# Patient Record
Sex: MALE | Race: WHITE | NOT HISPANIC OR LATINO | Employment: OTHER | URBAN - METROPOLITAN AREA
[De-identification: names, ages, dates, MRNs, and addresses within clinical notes are randomized per-mention and may not be internally consistent; named-entity substitution may affect disease eponyms.]

---

## 2017-03-01 ENCOUNTER — HOSPITAL ENCOUNTER (EMERGENCY)
Facility: HOSPITAL | Age: 67
Discharge: HOME | End: 2017-03-01
Payer: MEDICARE

## 2017-03-01 VITALS
HEIGHT: 69 IN | DIASTOLIC BLOOD PRESSURE: 71 MMHG | SYSTOLIC BLOOD PRESSURE: 161 MMHG | OXYGEN SATURATION: 95 % | TEMPERATURE: 98 F | BODY MASS INDEX: 29.62 KG/M2 | RESPIRATION RATE: 18 BRPM | HEART RATE: 87 BPM | WEIGHT: 200 LBS

## 2017-03-01 DIAGNOSIS — Z71.6 TOBACCO ABUSE COUNSELING: ICD-10-CM
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
## 2017-03-01 DIAGNOSIS — S43.002A SHOULDER SUBLUXATION, LEFT, INITIAL ENCOUNTER: ICD-10-CM

## 2017-03-01 DIAGNOSIS — M25.512 ACUTE PAIN OF LEFT SHOULDER: ICD-10-CM

## 2017-03-01 DIAGNOSIS — S42.352A CLOSED DISPLACED COMMINUTED FRACTURE OF SHAFT OF LEFT HUMERUS, INITIAL ENCOUNTER: Primary | ICD-10-CM
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
## 2017-03-01 PROCEDURE — 99283 EMERGENCY DEPT VISIT LOW MDM: CPT

## 2017-03-01 PROCEDURE — 25000003 PHARM REV CODE 250: Performed by: NURSE PRACTITIONER

## 2017-03-01 PROCEDURE — 29240 STRAPPING OF SHOULDER: CPT

## 2017-03-01 RX ORDER — HYDROCODONE BITARTRATE AND ACETAMINOPHEN 10; 325 MG/1; MG/1
1 TABLET ORAL
Status: COMPLETED | OUTPATIENT
Start: 2017-03-01 | End: 2017-03-01

## 2017-03-01 RX ORDER — HYDROCODONE BITARTRATE AND ACETAMINOPHEN 10; 325 MG/1; MG/1
1 TABLET ORAL EVERY 4 HOURS PRN
Qty: 18 TABLET | Refills: 0 | Status: SHIPPED | OUTPATIENT
Start: 2017-03-01 | End: 2017-06-06

## 2017-03-01 RX ORDER — MELOXICAM 15 MG/1
15 TABLET ORAL DAILY
Qty: 30 TABLET | Refills: 0 | Status: SHIPPED | OUTPATIENT
Start: 2017-03-01 | End: 2017-06-06

## 2017-03-01 RX ADMIN — HYDROCODONE BITARTRATE AND ACETAMINOPHEN 1 TABLET: 10; 325 TABLET ORAL at 08:03

## 2017-03-01 NOTE — ED AVS SNAPSHOT
[facility]  [address] [facility] [address]               Kwesi Garcia   3/1/2017  7:53 PM   ED    Description:  Male : 1950   Department:  [facility]            Your Care was Coordinated By:     Provider Role From To    Sang Gregg NP Nurse Practitioner 17 --      Reason for Visit     Shoulder Pain           Diagnoses this Visit        Comments    Closed displaced comminuted fracture of shaft of left humerus, initial encounter    -  Primary     Fall         Shoulder subluxation, left, initial encounter         Fall at home, initial encounter         Acute pain of left shoulder         Tobacco abuse counseling           ED Disposition     None           To Do List           Follow-up Information     Schedule an appointment as soon as possible for a visit with pcp or ER .        Schedule an appointment as soon as possible for a visit with [facility].    Specialty:  Orthopedics    Contact information:    [address] [facility] [address] [phone]    Additional information:    (off [facility]) 1st floor       These Medications        Disp Refills Start End    hydrocodone-acetaminophen 10-325mg (NORCO)  mg Tab 18 tablet 0 3/1/2017     Take 1 tablet by mouth every 4 (four) hours as needed for Pain. - Oral    meloxicam (MOBIC) 15 MG tablet 30 tablet 0 3/1/2017     Take 1 tablet (15 mg total) by mouth once daily. - Oral      [facility]     [facility] -  Assistance  Registered nurses in the [facility] provide clinical advisement, health education, appointment booking, and other advisory services.  Call for this free service at [phone].             Medications           START taking these NEW medications        Refills    hydrocodone-acetaminophen 10-325mg (NORCO)  mg Tab 0    Sig: Take 1 tablet by mouth every 4 (four) hours as  needed for Pain.    Class: Print    Route: Oral    meloxicam (MOBIC) 15 MG tablet 0    Sig: Take 1 tablet (15 mg total) by mouth once daily.    Class: Print    Route: Oral      These medications were administered today        Dose Freq    hydrocodone-acetaminophen 10-325mg per tablet 1 tablet 1 tablet ED 1 Time    Sig: Take 1 tablet by mouth ED 1 Time.    Class: Normal    Route: Oral    Cosign for Ordering: Accepted by Nicholas Strange MD on 3/1/2017  8:11 PM           Verify that the below list of medications is an accurate representation of the medications you are currently taking.  If none reported, the list may be blank. If incorrect, please contact your healthcare provider. Carry this list with you in case of emergency.           Current Medications     hydrocodone-acetaminophen 10-325mg (NORCO)  mg Tab Take 1 tablet by mouth every 4 (four) hours as needed for Pain.    meloxicam (MOBIC) 15 MG tablet Take 1 tablet (15 mg total) by mouth once daily.           Clinical Reference Information           Your Vitals Were     BP                   170/88 (BP Location: Right arm, Patient Position: Sitting)           Allergies as of 3/1/2017        Reactions    Ampicillin       Immunizations Administered on Date of Encounter - 3/1/2017     None      ED Micro, Lab, POCT     None      ED Imaging Orders     Start Ordered       Status Ordering Provider    [birth year]    1 time imaging,   Status:  Canceled      Canceled     03/01/17 1947 03/01/17 1946  X-Ray Shoulder Trauma Left  1 time imaging      Final result         Discharge Instructions         Understanding a Humerus Fracture      When you have a humerus fracture, it means that your upper arm bone is broken.This type of fracture most often occurs along the middle of the bone or at the end of the bone near the shoulder. Less often, it occurs at the end of the bone near the elbow. This mainly happens in kids or young adults.  The bone may be cracked,  or it may be broken into 2 or more pieces. The pieces of bone may be lined up or they may have moved out of place. Sometimes, the bone may break through the skin. Nearby nerves, tissues, and joints also may be damaged. Depending on the severity of the fracture, healing may take several months or longer.  What causes a humerus fracture?  A humerus fracture is most often the result of trauma. This may be from a fall, blow, accident, or sports injury.  Symptoms of a humerus fracture  Symptoms can include pain, swelling, and bruising. If the bone breaks through the skin,  bleeding can occur at the site. It may be hard to move and use the shoulder, arm, or elbow as you would normally.  Treating a humerus fracture  Treatment depends on where the bone is broken and how serious the break is. If needed, the bone is put back into place. This may be done with or without surgery. If surgery is needed, the surgeon may use devices such as pins, plates, or screws to hold the bone together. You will then wear a sling, splint, or cast to keep the bone in place and protect it from injury during healing. Other treatments may be also used to help reduce symptoms or regain function. These include:  · Cold packs. Putting an ice pack on the injured area may help reduce swelling and pain.  · Pain medicines. Taking prescription or over-the-counter pain medicines may help reduce pain and swelling.  · Exercises. Doing certain exercises at home or with a physical therapist can help improve strength, flexibility, and range of motion in the shoulder, arm, or elbow.  Possible complications of a humerus fracture  These can include:  · Poor healing of the bone  · Weakness, stiffness, or loss of range of motion in the shoulder, arm, or elbow  · Osteoarthritis in the shoulder or elbow  When to call your healthcare provider  Call your healthcare provider right away if you have any of these:  · Fever of 100.4°F (38°C) or higher, or as  directed  · Symptoms that dont get better with treatment, or get worse  · Numbness, tingling, coldness, or swelling in your arm, hand, or fingers  · Fingernails that turn blue or gray in color  · A sling, splint, or cast that is damaged or feels too tight or loose  · New symptoms   Date Last Reviewed: 3/10/2016  © 5207-0129 [employer]. [address] [place] [address], [place] [address]. All rights reserved. This information is not intended as a substitute for professional medical care. Always follow your healthcare professional's instructions.          [facility] Sign-Up     Activating your [facility] account is as easy as 1-2-3!     1) Visit [email or web address], select Sign Up Now, enter this activation code and your date of birth, then select Next.  [email or web address]  Expires: 4/15/2017  9:25 PM      2) Create a username and password to use when you visit [facility] in the future and select a security question in case you lose your password and select Next.    3) Enter your e-mail address and click Sign Up!    Additional Information  If you have questions, please e-mail [email or web address] or call [phone] to talk to our [facility] staff. Remember, [facility] is NOT to be used for urgent needs. For medical emergencies, dial 911.         Smoking Cessation     If you would like to quit smoking:   You may be eligible for free services if you are a [place] resident and started smoking cigarettes before September 1, 1988.  Call the Smoking Cessation Trust (SCT) toll free at (681) 391-1078 or (134) 323-3741.   Call 3-764-QUIT-NOW if you do not meet the above criteria.             [facility] complies with applicable Federal civil rights laws and does not discriminate on the basis of race, color, national origin, age, disability, or sex.        Language Assistance Services     ATTENTION: Language assistance services are available, free of charge. Please call [phone].      ATENCIÓN: Selene mccray  español, tiene a mendez disposición servicios gratuitos de asistencia lingüística. Llame al [phone].     CLAU Ý: N?u b?n nói Ti?ng Vi?t, có các d?ch v? h? tr? ngôn ng? mi?n phí dành cho b?n. G?i s? [phone].

## 2017-03-02 NOTE — DISCHARGE INSTRUCTIONS
Understanding a Humerus Fracture      When you have a humerus fracture, it means that your upper arm bone is broken.This type of fracture most often occurs along the middle of the bone or at the end of the bone near the shoulder. Less often, it occurs at the end of the bone near the elbow. This mainly happens in kids or young adults.  The bone may be cracked, or it may be broken into 2 or more pieces. The pieces of bone may be lined up or they may have moved out of place. Sometimes, the bone may break through the skin. Nearby nerves, tissues, and joints also may be damaged. Depending on the severity of the fracture, healing may take several months or longer.  What causes a humerus fracture?  A humerus fracture is most often the result of trauma. This may be from a fall, blow, accident, or sports injury.  Symptoms of a humerus fracture  Symptoms can include pain, swelling, and bruising. If the bone breaks through the skin,  bleeding can occur at the site. It may be hard to move and use the shoulder, arm, or elbow as you would normally.  Treating a humerus fracture  Treatment depends on where the bone is broken and how serious the break is. If needed, the bone is put back into place. This may be done with or without surgery. If surgery is needed, the surgeon may use devices such as pins, plates, or screws to hold the bone together. You will then wear a sling, splint, or cast to keep the bone in place and protect it from injury during healing. Other treatments may be also used to help reduce symptoms or regain function. These include:  · Cold packs. Putting an ice pack on the injured area may help reduce swelling and pain.  · Pain medicines. Taking prescription or over-the-counter pain medicines may help reduce pain and swelling.  · Exercises. Doing certain exercises at home or with a physical therapist can help improve strength, flexibility, and range of motion in the shoulder, arm, or elbow.  Possible complications  of a humerus fracture  These can include:  · Poor healing of the bone  · Weakness, stiffness, or loss of range of motion in the shoulder, arm, or elbow  · Osteoarthritis in the shoulder or elbow  When to call your healthcare provider  Call your healthcare provider right away if you have any of these:  · Fever of 100.4°F (38°C) or higher, or as directed  · Symptoms that dont get better with treatment, or get worse  · Numbness, tingling, coldness, or swelling in your arm, hand, or fingers  · Fingernails that turn blue or gray in color  · A sling, splint, or cast that is damaged or feels too tight or loose  · New symptoms   Date Last Reviewed: 3/10/2016  © 5788-8325 [employer]. [address] [place] [address], [place] [address]. All rights reserved. This information is not intended as a substitute for professional medical care. Always follow your healthcare professional's instructions.

## 2017-03-02 NOTE — ED PROVIDER NOTES
SCRIBE #1 NOTE: I, Sergio Mckenna, am scribing for, and in the presence of, MELINA Palma. I have scribed the entire note.      History      Chief Complaint   Patient presents with    Shoulder Pain     L shoulder pain after falling and landing on it yesterday       Review of patient's allergies indicates:   Allergen Reactions    Ampicillin         HPI   HPI    3/1/2017, 7:54 PM   History obtained from the patient      History of Present Illness: Kwesi Garcia is a 66 y.o. male patient who presents to the Emergency Department for Evaluation of left shoulder pain onset after a ground level fall which occurred last night. Pt states he was drinking last night and he fell onto his left shoulder on the floor at his apartment. Sx are constant and moderate in severity. Sx are described as generalized pain. There are no mitigating or exacerbating factors noted. Associated sx include decreased ROM of shoulder.  Pt denies any head trauma, hip pain, knee pain, back pain, neck pain/stiffness, LOC, dizziness, abd pain, CP, SOB, weakness or numbness to extremities, and all other sx at this time. No further complaints or concerns at this time.         Arrival mode: Personal vehicle      PCP: Primary Doctor No       Past Medical History:  History reviewed. No pertinent past medical history.    Past Surgical History:  Past Surgical History:   Procedure Laterality Date    ADENOIDECTOMY      APPENDECTOMY      TONSILLECTOMY           Family History:  History reviewed. No pertinent family history.    Social History:  Social History     Social History Main Topics    Smoking status: Current Every Day Smoker     Packs/day: 0.50     Types: Cigarettes    Smokeless tobacco: Not on file    Alcohol use Yes      Comment: 3 times a week    Drug use: No    Sexual activity: Not on file       ROS   Review of Systems   Constitutional: Negative for chills and fever.   HENT: Negative for sore throat.    Eyes: Negative for visual  disturbance.   Respiratory: Negative for cough and shortness of breath.    Cardiovascular: Negative for chest pain.   Gastrointestinal: Negative for abdominal pain, nausea and vomiting.   Genitourinary: Negative for dysuria.   Musculoskeletal: Negative for back pain, neck pain and neck stiffness.        - hip pain  - knee pain  + left shoulder pain  + decreased ROM L shoulder     Skin: Negative for rash.   Neurological: Negative for dizziness, syncope, speech difficulty, weakness, numbness and headaches.   Hematological: Does not bruise/bleed easily.   All other systems reviewed and are negative.      Physical Exam    Initial Vitals   BP Pulse Resp Temp SpO2   03/01/17 1917 03/01/17 1917 03/01/17 1917 03/01/17 1917 03/01/17 1917   170/88 90 18 97.8 °F (36.6 °C) 95 %      Physical Exam  Nursing Notes and Vital Signs Reviewed.  Constitutional: Patient is in no acute distress. Awake and alert. Well-developed and well-nourished.  Head: Atraumatic. Normocephalic.  Eyes: PERRL. EOM intact. Conjunctivae are not pale. No scleral icterus.  ENT: Mucous membranes are moist. Oropharynx is clear and symmetric.    Neck: Supple. Full ROM.  No vertebral point tenderness.  Cardiovascular: Regular rate. Regular rhythm. No murmurs, rubs, or gallops. Distal pulses are 2+ and symmetric.  Pulmonary/Chest: No respiratory distress. Clear to auscultation bilaterally. No wheezing, rales, or rhonchi.  Abdominal: Soft and non-distended.  There is no tenderness.  No rebound, guarding, or rigidity.     Musculoskeletal: Moves all extremities.  No edema.  No vertebral point tenderness.  LUE: has no evident deformity. negative for swelling. Positive for tenderness to the shoulder. ROM is decreased. Cap refill distally is <2 seconds. Radial pulses are equal and 2+ bilaterally. No distal sensory deficit.  Skin: Warm and dry.  Neurological:  Alert, awake, and appropriate.  Normal speech.  No acute focal neurological deficits are  "appreciated.  Psychiatric: Normal affect. Good eye contact. Appropriate in content.    ED Course    Orthopedic Injury  Date/Time: 3/1/2017 9:22 PM  Authorized by: GEREMIAS LORD   Performed by: GEREMIAS LORD  Consent Done: Not Needed  Injury location: upper arm  Location details: left upper arm  Injury type: fracture (Comminuted, displaced and overlapped proximal left humerus fracture.  Mild subluxation of the humeral head in relationship to the glenoid)  Pre-procedure distal perfusion: normal  Pre-procedure neurological function: normal  Pre-procedure neurovascular assessment: neurovascularly intact  Pre-procedure range of motion: reduced  Manipulation performed: no  Immobilization: sling (and swathe)  Complications: No  Post-procedure neurovascular assessment: post-procedure neurovascularly intact  Post-procedure distal perfusion: normal  Post-procedure neurological function: normal  Post-procedure range of motion: unchanged  Patient tolerance: Patient tolerated the procedure well with no immediate complications        ED Vital Signs:  Vitals:    03/01/17 1917 03/01/17 2138   BP: (!) 170/88 (!) 161/71   Pulse: 90 87   Resp: 18 18   Temp: 97.8 °F (36.6 °C)    TempSrc: Oral    SpO2: 95% 95%   Weight: 90.7 kg (200 lb)    Height: 5' 9" (1.753 m)          Imaging Results:  Imaging Results         X-Ray Shoulder Trauma Left (Final result) Result time:  03/01/17 20:21:01    Final result by Sammy Berry MD (03/01/17 20:21:01)    Impression:           1.  Comminuted, displaced and overlapped proximal left humerus fracture.  Mild subluxation of the humeral head in relationship to the glenoid, possibly related to a shoulder joint effusion.  2.  Negative for acute process otherwise.  3. Incidental findings as noted above.        Electronically signed by: SAMYM BERRY MD  Date:     03/01/17  Time:    20:21     Narrative:    Left shoulder x-ray, 2 views :    Clinical Indication: Injury to the left shoulder from an " unspecified fall.  Pain in left shoulder.     Findings: No comparison studies are available. 3 views of the left shoulder were submitted for interpretation.  There is a comminuted, displaced and compressed proximal left humerus fracture.  There appears to be a shoulder effusion with possible subluxation of the humeral head in relationship to the glenoid, inferiorly.     Alignment is satisfactory. No other fractures, dislocations, or erosive arthritic change.  Negative for radiopaque foreign bodies or air in the soft tissues. Old left clavicle fracture noted. There are calcifications of the aortic knob and tortuosity of the descending thoracic aorta. There is old granulomatous disease.  Visualized portions of the chest are otherwise normal.                 The Emergency Provider reviewed the vital signs and test results, which are outlined above.    ED Discussion     9:15 PM:  Discussed the pt's case with Dr. Montague (orthopedics) who recommends shoulder immobilizer or sling and swath whichever is available. To instruct pt to keep moving his hand and fingers to prevent swelling. Send pt home with pain medication, and to have pt f/u in clinic.      9:25 PM: Reassessed pt at this time. Awake and alert. NAD. Pt states his pain has improved at this time. Discussed with pt all pertinent ED information and results. Discussed pt dx and plan of tx. Gave pt all f/u in ortho clinic and return to the ED instructions. Reviewed consult with Dr. Montague with pt. Have pt move hands and fingers every hour.  All questions and concerns were addressed at this time. Pt expresses understanding of information and instructions, and is comfortable with plan to discharge. Pt is stable for discharge.    I discussed with patient and/or family/caretaker that evaluation in the ED does not suggest any emergent or life threatening medical conditions requiring immediate intervention beyond what was provided in the ED, and I believe patient is safe  for discharge.  Regardless, an unremarkable evaluation in the ED does not preclude the development or presence of a serious of life threatening condition. As such, patient was instructed to return immediately for any worsening or change in current symptoms.      Pre-hypertension/Hypertension: The pt has been informed that they may have pre-hypertension or hypertension based on a blood pressure reading in the ED. I recommend that the pt call the PCP listed on their discharge instructions or a physician of their choice this week to arrange f/u for further evaluation of possible pre-hypertension or hypertension.     ED Medication(s):  Medications   hydrocodone-acetaminophen 10-325mg per tablet 1 tablet (1 tablet Oral Given 3/1/17 2018)       Discharge Medication List as of 3/1/2017  9:25 PM      START taking these medications    Details   hydrocodone-acetaminophen 10-325mg (NORCO)  mg Tab Take 1 tablet by mouth every 4 (four) hours as needed for Pain., Starting 3/1/2017, Until Discontinued, Print      meloxicam (MOBIC) 15 MG tablet Take 1 tablet (15 mg total) by mouth once daily., Starting 3/1/2017, Until Discontinued, Print             Follow-up Information     Schedule an appointment as soon as possible for a visit with pcp or ER .        Schedule an appointment as soon as possible for a visit with [facility].    Specialty:  Orthopedics    Contact information:    [address] [place] [address] [phone]    Additional information:    (off [facility]) 1st floor             Medical Decision Making    Medical Decision Making:   Clinical Tests:   Radiological Study: Ordered and Reviewed     Additional MDM:   Smoking Cessation: The patient is a smoker. The patient was counseled on smoking cessation for: 5 minutes. The patient was counseled on tobacco related  health complications.        Scribe Attestation:   Scribe #1: I performed the above scribed service and the  documentation accurately describes the services I performed. I attest to the accuracy of the note.    APC:   APC Attestation Statement for Scribe #1: I, Sang Gregg NP, personally performed the services described in this documentation, as scribed by Sergio Mckenna in my presence, and it is both accurate and complete.          Clinical Impression       ICD-10-CM ICD-9-CM   1. Closed displaced comminuted fracture of shaft of left humerus, initial encounter S42.352A 812.21   2. Fall W19.XXXA E888.9   3. Shoulder subluxation, left, initial encounter S43.002A 831.00   4. Fall at home, initial encounter W19.XXXA E888.9    Y92.099 E849.0   5. Acute pain of left shoulder M25.512 719.41   6. Tobacco abuse counseling Z71.6 V65.42     305.1       Disposition:   Disposition: Discharged  Condition: Stable         Sang Gregg NP  03/02/17 0246

## 2017-03-06 ENCOUNTER — OFFICE VISIT (OUTPATIENT)
Dept: ORTHOPEDICS | Facility: CLINIC | Age: 67
End: 2017-03-06
Payer: MEDICARE

## 2017-03-06 VITALS
HEIGHT: 69 IN | HEART RATE: 82 BPM | WEIGHT: 200 LBS | BODY MASS INDEX: 29.62 KG/M2 | SYSTOLIC BLOOD PRESSURE: 125 MMHG | DIASTOLIC BLOOD PRESSURE: 78 MMHG

## 2017-03-06 DIAGNOSIS — S42.242A CLOSED 4-PART FRACTURE OF SURGICAL NECK OF LEFT HUMERUS, INITIAL ENCOUNTER: Primary | ICD-10-CM
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
## 2017-03-06 PROCEDURE — 99204 OFFICE O/P NEW MOD 45 MIN: CPT | Mod: S$PBB,,, | Performed by: PHYSICIAN ASSISTANT

## 2017-03-06 PROCEDURE — 99213 OFFICE O/P EST LOW 20 MIN: CPT | Mod: PBBFAC | Performed by: PHYSICIAN ASSISTANT

## 2017-03-06 PROCEDURE — 99999 PR PBB SHADOW E&M-EST. PATIENT-LVL III: CPT | Mod: PBBFAC,,, | Performed by: PHYSICIAN ASSISTANT
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
## 2017-03-08 ENCOUNTER — TELEPHONE (OUTPATIENT)
Dept: RADIOLOGY | Facility: HOSPITAL | Age: 67
End: 2017-03-08

## 2017-03-14 ENCOUNTER — HOSPITAL ENCOUNTER (OUTPATIENT)
Dept: RADIOLOGY | Facility: HOSPITAL | Age: 67
Discharge: HOME | End: 2017-03-14
Attending: ORTHOPAEDIC SURGERY
Payer: MEDICARE

## 2017-03-14 DIAGNOSIS — S42.242A CLOSED 4-PART FRACTURE OF SURGICAL NECK OF LEFT HUMERUS, INITIAL ENCOUNTER: ICD-10-CM
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
## 2017-03-14 PROCEDURE — 73221 MRI JOINT UPR EXTREM W/O DYE: CPT | Mod: TC,LT

## 2017-03-16 ENCOUNTER — TELEPHONE (OUTPATIENT)
Dept: ORTHOPEDICS | Facility: CLINIC | Age: 67
End: 2017-03-16

## 2017-03-16 NOTE — TELEPHONE ENCOUNTER
Contacted pt. Regarding appt scheduled to be seen 4/09/12 and rescheduled for sooner date of 3/21/17 at 1:15 PM at [facility] location.  Pt provided updated information regarding appt date,time, and location.  Pt. Verbalized an understanding to information given.

## 2017-03-21 ENCOUNTER — TELEPHONE (OUTPATIENT)
Dept: ORTHOPEDICS | Facility: CLINIC | Age: 67
End: 2017-03-21

## 2017-03-21 NOTE — TELEPHONE ENCOUNTER
Called pt to inform of Dr. Rollins running behind due to emergency surgery. Pt verified understanding.

## 2017-04-03 ENCOUNTER — TELEPHONE (OUTPATIENT)
Dept: ORTHOPEDICS | Facility: CLINIC | Age: 67
End: 2017-04-03

## 2017-04-20 ENCOUNTER — OFFICE VISIT (OUTPATIENT)
Dept: ORTHOPEDICS | Facility: CLINIC | Age: 67
End: 2017-04-20
Payer: MEDICARE

## 2017-04-20 VITALS — BODY MASS INDEX: 24.5 KG/M2 | HEIGHT: 69 IN | WEIGHT: 165.38 LBS

## 2017-04-20 DIAGNOSIS — M25.512 CHRONIC LEFT SHOULDER PAIN: Primary | ICD-10-CM

## 2017-04-20 DIAGNOSIS — G89.29 CHRONIC LEFT SHOULDER PAIN: Primary | ICD-10-CM

## 2017-04-20 DIAGNOSIS — M25.512 ACUTE PAIN OF LEFT SHOULDER: Primary | ICD-10-CM

## 2017-04-20 PROCEDURE — 99212 OFFICE O/P EST SF 10 MIN: CPT | Mod: PBBFAC,PO | Performed by: ORTHOPAEDIC SURGERY

## 2017-04-20 PROCEDURE — 99213 OFFICE O/P EST LOW 20 MIN: CPT | Mod: S$PBB,,, | Performed by: ORTHOPAEDIC SURGERY

## 2017-04-20 PROCEDURE — 99999 PR PBB SHADOW E&M-EST. PATIENT-LVL II: CPT | Mod: PBBFAC,,, | Performed by: ORTHOPAEDIC SURGERY

## 2017-04-20 NOTE — PROGRESS NOTES
"CC:This is a 66-year-old male that complains of left shoulder pain.    HPI:The patient states that he injured his left shoulder Approximately 1 month ago.    PMH:  No past medical history on file.    PSH:    Past Surgical History:   Procedure Laterality Date    ADENOIDECTOMY      APPENDECTOMY      TONSILLECTOMY         Family Hx:  No family history on file.    Allergy:    Review of patient's allergies indicates:   Allergen Reactions    Ampicillin        Medication:    Current Outpatient Prescriptions:     hydrocodone-acetaminophen 10-325mg (NORCO)  mg Tab, Take 1 tablet by mouth every 4 (four) hours as needed for Pain., Disp: 18 tablet, Rfl: 0    meloxicam (MOBIC) 15 MG tablet, Take 1 tablet (15 mg total) by mouth once daily., Disp: 30 tablet, Rfl: 0    Social History:    Social History     Social History    Marital status:      Spouse name: N/A    Number of children: N/A    Years of education: N/A     Occupational History    Not on file.     Social History Main Topics    Smoking status: Current Every Day Smoker     Packs/day: 0.50     Types: Cigarettes    Smokeless tobacco: Never Used    Alcohol use 1.8 oz/week     3 Standard drinks or equivalent per week      Comment: 3 times a week    Drug use: No    Sexual activity: Not on file     Other Topics Concern    Not on file     Social History Narrative       Vitals:   Ht 5' 9" (1.753 m)  Wt 75 kg (165 lb 5.5 oz)  BMI 24.42 kg/m2     ROS:  GENERAL: No fever, chills, fatigability or weight loss.  SKIN: No rashes, itching or changes in color or texture of skin.  HEAD: No headaches or recent head trauma.  EYES: Visual acuity fine. No photophobia, ocular pain or diplopia.  EARS: Denies ear pain, discharge or vertigo.  NOSE: No loss of smell, no epistaxis or postnasal drip.  MOUTH & THROAT: No hoarseness or change in voice. No excessive gum bleeding.  NODES: Denies swollen glands.  CHEST: Denies PARSONS, cyanosis, wheezing, cough and sputum " production.  CARDIOVASCULAR: Denies chest pain, PND, orthopnea or reduced exercise tolerance.  ABDOMEN: Appetite fine. No weight loss. Denies diarrhea, abdominal pain, hematemesis or blood in stool.  URINARY: No flank pain, dysuria or hematuria.  PERIPHERAL VASCULAR: No claudication or cyanosis.  NEUROLOGIC: No history of seizures, paralysis, alteration of gait or coordination.  MUSCULOSKELETAL: See HPI    PE:  APPEARANCE: Well nourished, well developed, in no acute distress.   HEAD: Normocephalic, atraumatic.  EYES: PERRL. EOMI.   EARS: TM's intact. Light reflex normal. No retraction or perforation.   NOSE: Mucosa pink. Airway clear.  MOUTH & THROAT: No tonsillar enlargement. No pharyngeal erythema or exudate. No stridor.  NECK: Supple.   NODES: No cervical, axillary or inguinal lymph node enlargement.  CHEST: Lungs clear to auscultation.  CARDIOVASCULAR: Normal S1, S2. No rubs, murmurs or gallops.  ABDOMEN: Bowel sounds normal. Not distended. Soft. No tenderness or masses.  NEUROLOGIC: Cranial Nerves: II-XII grossly intact, also see MUSCULOSKELETAL  MUSCULOSKELETAL:            Left Shoulder-  Dressing intact, 2 plus radial  artery and ulnar artery pulses, light touch intact Left upper extremity.  All digits are warm. No erythema, no warmth, no drainage, minimalswelling, significant tenderness.           Assessment:  i have explained to the patient the risk and benefit of performing an open reduction and internal fixation, conservative nonsurgical treatment, and a reverse shoulder replacement.         Diagnosis:              1.left shoulder four part humeral head fracture                   Diagnostic Studies  MRI-No  X-Ray-No  EMG/NCV-No  Arthrogram-No  Bone Scan-No  CT Scan-Yes  Doppler-No  ESR-No  CRP-No  CBC with Diff-No   Rheumatoid/Arthritis Panel-No      Plan:                                                 1. PT-no                                                 2.OT-yes                                           3.NSAID-yes                                        4. Narcotics-yes                                     5. Wound care-No                                 6. Rest-yes                                           7. Surgery-no                                         8. EDWIN Hose-no                                    9. Anticoagulation therapy-no               10. Elevation-no                                     11. Crutches-no                                    12. Walker-no             13. Cane no                        14. Referral-no                                     15.Injection-no                            16. Splint   /    Cast   /   Cast Shoe-No              17. RICE-none            18. Follow up- 3 weeks

## 2017-04-20 NOTE — PATIENT INSTRUCTIONS
Reverse Total Shoulder Replacement  Reverse total shoulder replacement is a type of surgery. Its done to repair an injury to the rotator cuff.  Understanding the shoulder joint  The shoulder joint is where the ball-shaped part of the upper arm bone (humerus) meets the cup-shaped socket of the shoulder blade (scapula). A group of muscles and tendons hold the joint together. These muscles and tendons are called the rotator cuff. The muscles let you move your arm and shoulder.  Why reverse total shoulder replacement is done  The surgery may be needed if you have a complete tear of your rotator cuff. The tear may cause long-term problems with your shoulder joint. This is called cuff tear arthropathy. You may need reverse total shoulder replacement surgery if you have any of these:  · A complete tear in your rotator cuff  · Joint problems from the cuff tear (cuff tear arthropathy)  · Previous total shoulder replacement surgery that didnt relieve symptoms  · Severe pain and trouble moving your shoulder  · No success relieving your symptoms with treatments such as rest, medicines, cortisone injections, or physical therapy  How reverse total shoulder replacement is done  The surgery is the opposite (reverse) of standard total shoulder replacement surgery:  · In the standard surgery, the ball of the humerus is replaced with an artificial ball. The socket of the scapula is replaced with an artificial socket. The new joint still uses the rotator cuff muscles to move the arm and shoulder.  · With the reverse surgery, the ball of the humerus is replaced with an artificial socket. The socket of the scapula is replaced with an artificial ball. Since the rotator cuff muscles are damaged, another muscle (deltoid) moves the arm and shoulder.  Risks of reverse total shoulder replacement  Every surgery has risks. Risks for this surgery include:  · Infection  · Blood loss  · Damage to nerves that may make it hard to move your  arm  · Damage to the humerus or scapula  · Artificial joint moving out of position (dislocation)  · Problems with general anesthesia  Some risks may be higher if you have had shoulder surgery in the past. Your risks may vary depending on your shoulder problem and your overall health. Talk with your doctor about which risks apply most to you.  Date Last Reviewed: 6/16/2015 © 2000-2016 [employer]. [address] [place] [address]. All rights reserved. This information is not intended as a substitute for professional medical care. Always follow your healthcare professional's instructions.

## 2017-04-24 ENCOUNTER — TELEPHONE (OUTPATIENT)
Dept: RADIOLOGY | Facility: HOSPITAL | Age: 67
End: 2017-04-24

## 2017-04-25 ENCOUNTER — HOSPITAL ENCOUNTER (OUTPATIENT)
Dept: RADIOLOGY | Facility: HOSPITAL | Age: 67
Discharge: HOME | End: 2017-04-25
Attending: ORTHOPAEDIC SURGERY
Payer: MEDICARE

## 2017-04-25 DIAGNOSIS — G89.29 CHRONIC LEFT SHOULDER PAIN: ICD-10-CM

## 2017-04-25 DIAGNOSIS — M25.512 CHRONIC LEFT SHOULDER PAIN: ICD-10-CM

## 2017-04-25 PROCEDURE — 73200 CT UPPER EXTREMITY W/O DYE: CPT | Mod: TC,PO,LT

## 2017-04-25 PROCEDURE — 73200 CT UPPER EXTREMITY W/O DYE: CPT | Mod: 26,LT,, | Performed by: RADIOLOGY

## 2017-05-01 ENCOUNTER — TELEPHONE (OUTPATIENT)
Dept: ORTHOPEDICS | Facility: CLINIC | Age: 67
End: 2017-05-01

## 2017-05-01 NOTE — TELEPHONE ENCOUNTER
----- Message from Emily Gandara sent at 5/1/2017 12:32 PM CDT -----  Contact: self [phone]  State that he need a statement on a letterhead stating that pt is unable to return to work. Please call back at [phone]//thank you acc

## 2017-05-01 NOTE — TELEPHONE ENCOUNTER
Returned pt phone call.pt states he needs a return to work letter. Pt was also informed of a an upcoming appointment for 5/4 at 1:15p. Pt confirmed.

## 2017-05-01 NOTE — TELEPHONE ENCOUNTER
----- Message from Karnia Piper sent at 5/1/2017  1:09 PM CDT -----  returned call..[phone] (home)

## 2017-05-04 ENCOUNTER — OFFICE VISIT (OUTPATIENT)
Dept: ORTHOPEDICS | Facility: CLINIC | Age: 67
End: 2017-05-04
Payer: MEDICARE

## 2017-05-04 VITALS
HEIGHT: 69 IN | WEIGHT: 155.63 LBS | SYSTOLIC BLOOD PRESSURE: 130 MMHG | BODY MASS INDEX: 23.05 KG/M2 | DIASTOLIC BLOOD PRESSURE: 88 MMHG | HEART RATE: 83 BPM

## 2017-05-04 DIAGNOSIS — G89.29 CHRONIC LEFT SHOULDER PAIN: Primary | ICD-10-CM

## 2017-05-04 DIAGNOSIS — M25.512 CHRONIC LEFT SHOULDER PAIN: Primary | ICD-10-CM

## 2017-05-04 DIAGNOSIS — Z01.818 PRE-OP TESTING: ICD-10-CM

## 2017-05-04 DIAGNOSIS — S42.352G: Primary | ICD-10-CM

## 2017-05-04 PROCEDURE — 99213 OFFICE O/P EST LOW 20 MIN: CPT | Mod: S$PBB,,, | Performed by: PHYSICIAN ASSISTANT

## 2017-05-04 PROCEDURE — 99213 OFFICE O/P EST LOW 20 MIN: CPT | Mod: PBBFAC,PO | Performed by: PHYSICIAN ASSISTANT

## 2017-05-04 PROCEDURE — 99999 PR PBB SHADOW E&M-EST. PATIENT-LVL III: CPT | Mod: PBBFAC,,, | Performed by: PHYSICIAN ASSISTANT

## 2017-05-04 NOTE — PROGRESS NOTES
"CC: 66-year-old male left shoulder pain  Date of injury; March 1, 2017,     HPI: The patient is at home, he tripped over his daughter and let us left shoulder.  He reported emergency room for evaluation where he was diagnosed with a fractured humeral head.  He's tried to let this heal conservatively however this point his range of motion is decreasing he's had increased pain and his affect his ADLs.    PMH:  History reviewed. No pertinent past medical history.    PSH:    Past Surgical History:   Procedure Laterality Date    ADENOIDECTOMY      APPENDECTOMY      TONSILLECTOMY         Family Hx:  History reviewed. No pertinent family history.    Allergy:    Review of patient's allergies indicates:   Allergen Reactions    Ampicillin        Medication:    Current Outpatient Prescriptions:     hydrocodone-acetaminophen 10-325mg (NORCO)  mg Tab, Take 1 tablet by mouth every 4 (four) hours as needed for Pain., Disp: 18 tablet, Rfl: 0    meloxicam (MOBIC) 15 MG tablet, Take 1 tablet (15 mg total) by mouth once daily., Disp: 30 tablet, Rfl: 0    Social History:    Social History     Social History    Marital status:      Spouse name: N/A    Number of children: N/A    Years of education: N/A     Occupational History    Not on file.     Social History Main Topics    Smoking status: Current Every Day Smoker     Packs/day: 0.50     Types: Cigarettes    Smokeless tobacco: Never Used    Alcohol use 1.8 oz/week     3 Standard drinks or equivalent per week      Comment: 3 times a week    Drug use: No    Sexual activity: Not on file     Other Topics Concern    Not on file     Social History Narrative       Vitals:   /88  Pulse 83  Ht 5' 9" (1.753 m)  Wt 70.6 kg (155 lb 10.3 oz)  BMI 22.98 kg/m2     ROS:  GENERAL: No fever, chills, fatigability or weight loss.  SKIN: No rashes, itching or changes in color or texture of skin.  HEAD: No headaches or recent head trauma.  EYES: Visual acuity fine. No " photophobia, ocular pain or diplopia.  EARS: Denies ear pain, discharge or vertigo.  NOSE: No loss of smell, no epistaxis or postnasal drip.  MOUTH & THROAT: No hoarseness or change in voice. No excessive gum bleeding.  NODES: Denies swollen glands.  CHEST: Denies PARSONS, cyanosis, wheezing, cough and sputum production.  CARDIOVASCULAR: Denies chest pain, PND, orthopnea or reduced exercise tolerance.  ABDOMEN: Appetite fine. No weight loss. Denies diarrhea, abdominal pain, hematemesis or blood in stool.  URINARY: No flank pain, dysuria or hematuria.  PERIPHERAL VASCULAR: No claudication or cyanosis.  NEUROLOGIC: No history of seizures, paralysis, alteration of gait or coordination.  MUSCULOSKELETAL: See HPI    PE:  APPEARANCE: Well nourished, well developed, in no acute distress.   HEAD: Normocephalic, atraumatic.  NEUROLOGIC: Cranial Nerves: II-XII grossly intact, also see MUSCULOSKELETAL  MUSCULOSKELETAL: left Shoulder Exam     Muscle Appearance:Abnormal   Grooming:Normal  Spine Alignment-Normal  Muscle Atrophy-Yes  Deformities-Yes  Tenderness-Yes  Paresthesias-No  Range of Motion-decreased         Abd Pure-decreased         Abd Combined-decreased         Ext-decreased         Flex-decreased         Internal Rot-decreased         External Rot-decreased  Muscle Strength-decreased  Sensation-normal  Reflexes-Normal  Crepitus-Yes  Impingement-Yes  Apprehension-Yes  Fatigue-Yes  Scapular Winging-No  Muscle Tightness-Yes  Instability-Yes  Tests on Exam-no evidence of instability  Neurovascular Status-Normal  Skin-Normal  Positive Drop Arm testYes    Assessment:           Diagnosis:              1.  Left shoulder four-part humeral head fracture               2.  Internal left shoulder derangement    Diagnostic Studies  MRI-Yes, There are avulsion fractures associated with the attachment of the supraspinatus, infraspinatus, teres minor, and subscapularis tendons on the humeral head.    2.  There is a complete transverse  fracture through the left humeral neck.  The main portion of the left humeral head is displaced laterally by up to 14 mm.   3.  There is a large amount of fluid in the left glenohumeral joint that communicates with the subacromial/subdeltoid bursa.    4.  There are minimal osteoarthritic changes in the left acromioclavicular joint with no significant downward proliferation.  There is a type II acromion process with mild lateral downsloping.  X-Ray-Yes    CT Scan-Yes, Findings: There is a comminuted fracture involving the head/neck of the humerus that involvement of the articular surface of the humeral head medially and posteriorly.  The humeral shaft is displaced medially in relation to the humeral head with significant impaction noted at the fracture site.  There is comminution in the region of the greater tuberosity as well.  The articular surface of the humeral head is inferiorly subluxed in relation to the glenoid.  The a.c. joint is unremarkable in appearance.        Plan:                                                 1. PT-no                                                 2.OT-no                                          3.NSAID-no                                        4. Narcotics-no                                     5. Wound care-N/A                                 6. Rest-no                                           7. Surgery-yes  left reverse shoulder after failing conservative treatment                                     8. EDWIN Hose-no                                    9. Anticoagulation therapy-no               10. Elevation-no                                     11. Crutches-no                                    12. Walker-no             13. Cane no                        14. Referral-no                                     15.Injection-no                            16. Splint   /    Cast   /   Cast Shoe-No              17. RICE            18. Follow up-  all the patient's questions and concern  were answered great detail by Dr Rollins and myself.  The patient elects to proceed with a left reverse shoulder arthroplasty.   Risks and benefits were discussed in great detail including complications nonunion nonhealing and also he's been advised that his range of motion would not return to the 100% marked up prior to the injury.

## 2017-05-04 NOTE — PATIENT INSTRUCTIONS
Reverse Total Shoulder Replacement  Reverse total shoulder replacement is a type of surgery. Its done to repair an injury to the rotator cuff.  Understanding the shoulder joint  The shoulder joint is where the ball-shaped part of the upper arm bone (humerus) meets the cup-shaped socket of the shoulder blade (scapula). A group of muscles and tendons hold the joint together. These muscles and tendons are called the rotator cuff. The muscles let you move your arm and shoulder.  Why reverse total shoulder replacement is done  The surgery may be needed if you have a complete tear of your rotator cuff. The tear may cause long-term problems with your shoulder joint. This is called cuff tear arthropathy. You may need reverse total shoulder replacement surgery if you have any of these:  · A complete tear in your rotator cuff  · Joint problems from the cuff tear (cuff tear arthropathy)  · Previous total shoulder replacement surgery that didnt relieve symptoms  · Severe pain and trouble moving your shoulder  · No success relieving your symptoms with treatments such as rest, medicines, cortisone injections, or physical therapy  How reverse total shoulder replacement is done  The surgery is the opposite (reverse) of standard total shoulder replacement surgery:  · In the standard surgery, the ball of the humerus is replaced with an artificial ball. The socket of the scapula is replaced with an artificial socket. The new joint still uses the rotator cuff muscles to move the arm and shoulder.  · With the reverse surgery, the ball of the humerus is replaced with an artificial socket. The socket of the scapula is replaced with an artificial ball. Since the rotator cuff muscles are damaged, another muscle (deltoid) moves the arm and shoulder.  Risks of reverse total shoulder replacement  Every surgery has risks. Risks for this surgery include:  · Infection  · Blood loss  · Damage to nerves that may make it hard to move your  arm  · Damage to the humerus or scapula  · Artificial joint moving out of position (dislocation)  · Problems with general anesthesia  Some risks may be higher if you have had shoulder surgery in the past. Your risks may vary depending on your shoulder problem and your overall health. Talk with your doctor about which risks apply most to you.  Date Last Reviewed: 6/16/2015 © 2000-2016 [employer]. [address] [place] [address]. All rights reserved. This information is not intended as a substitute for professional medical care. Always follow your healthcare professional's instructions.

## 2017-05-05 ENCOUNTER — TELEPHONE (OUTPATIENT)
Dept: ORTHOPEDICS | Facility: CLINIC | Age: 67
End: 2017-05-05

## 2017-05-05 NOTE — TELEPHONE ENCOUNTER
----- Message from Ulises Mendoza PA-C sent at 5/5/2017  9:05 AM CDT -----  No lifting with left arm.

## 2017-05-05 NOTE — TELEPHONE ENCOUNTER
----- Message from Zahra Jewell sent at 5/5/2017  3:01 PM CDT -----  Call pt at [phone]//pt calling to get a clearance to return to work he is a  for [employer]///shira allen

## 2017-05-05 NOTE — TELEPHONE ENCOUNTER
----- Message from Alina Aldrich sent at 5/4/2017  6:18 PM CDT -----  Contact: Pt  Pt is calling to speak with nurse in regards to return to work status.  Pt can be reached at [phone].    Thank you

## 2017-05-05 NOTE — TELEPHONE ENCOUNTER
Returned pt phone call. Informed pt that he is clear to return to work as long as he does not lift with that injured extremity. Pt verified understanding.

## 2017-05-08 ENCOUNTER — TELEPHONE (OUTPATIENT)
Dept: ORTHOPEDICS | Facility: CLINIC | Age: 67
End: 2017-05-08

## 2017-05-08 NOTE — TELEPHONE ENCOUNTER
Pt calling to extend leave from work stating [employer] is not allowing him to work injured. Informed pt it would be faxed off later this afternoon. Pt verified understanding.

## 2017-05-08 NOTE — TELEPHONE ENCOUNTER
----- Message from Geetha Nails sent at 5/8/2017  8:22 AM CDT -----  Contact: pt-  [phone]  Pt would like to extend leave from work.    Fax- [employer]- [phone]    Case- [id number] [facility]    [place] ID number:  [id number]

## 2017-05-31 ENCOUNTER — TELEPHONE (OUTPATIENT)
Dept: ORTHOPEDICS | Facility: CLINIC | Age: 67
End: 2017-05-31

## 2017-05-31 NOTE — TELEPHONE ENCOUNTER
----- Message from Ly Dalton sent at 5/31/2017  9:21 AM CDT -----  Contact: pt  Please call pt @ [phone] regarding appt on 6/2 and pt surgery schedule on 6/21.

## 2017-06-02 ENCOUNTER — CLINICAL SUPPORT (OUTPATIENT)
Dept: CARDIOLOGY | Facility: CLINIC | Age: 67
End: 2017-06-02
Payer: MEDICARE

## 2017-06-02 ENCOUNTER — HOSPITAL ENCOUNTER (OUTPATIENT)
Dept: RADIOLOGY | Facility: HOSPITAL | Age: 67
Discharge: HOME | End: 2017-06-02
Attending: ORTHOPAEDIC SURGERY
Payer: MEDICARE

## 2017-06-02 DIAGNOSIS — Z01.818 PRE-OP TESTING: ICD-10-CM

## 2017-06-02 PROCEDURE — 71020 XR CHEST PA AND LATERAL PRE-OP: CPT | Mod: 26,,, | Performed by: RADIOLOGY

## 2017-06-02 PROCEDURE — 93010 ELECTROCARDIOGRAM REPORT: CPT | Mod: S$PBB,,, | Performed by: INTERNAL MEDICINE

## 2017-06-02 PROCEDURE — 93005 ELECTROCARDIOGRAM TRACING: CPT | Mod: PBBFAC,PO | Performed by: INTERNAL MEDICINE

## 2017-06-06 ENCOUNTER — OFFICE VISIT (OUTPATIENT)
Dept: INTERNAL MEDICINE | Facility: CLINIC | Age: 67
DRG: 483 | End: 2017-06-06
Payer: MEDICARE

## 2017-06-06 ENCOUNTER — TELEPHONE (OUTPATIENT)
Dept: ORTHOPEDICS | Facility: CLINIC | Age: 67
End: 2017-06-06

## 2017-06-06 VITALS
BODY MASS INDEX: 23.09 KG/M2 | OXYGEN SATURATION: 97 % | DIASTOLIC BLOOD PRESSURE: 82 MMHG | HEART RATE: 67 BPM | WEIGHT: 155.88 LBS | TEMPERATURE: 97 F | SYSTOLIC BLOOD PRESSURE: 122 MMHG | HEIGHT: 69 IN

## 2017-06-06 DIAGNOSIS — F17.200 SMOKER: ICD-10-CM

## 2017-06-06 DIAGNOSIS — Z01.818 PREOP EXAMINATION: ICD-10-CM

## 2017-06-06 DIAGNOSIS — S42.352A COMMINUTED LEFT HUMERAL FRACTURE, CLOSED, INITIAL ENCOUNTER: Primary | ICD-10-CM

## 2017-06-06 PROCEDURE — 99999 PR PBB SHADOW E&M-EST. PATIENT-LVL III: CPT | Mod: PBBFAC,,, | Performed by: INTERNAL MEDICINE

## 2017-06-06 PROCEDURE — 99204 OFFICE O/P NEW MOD 45 MIN: CPT | Mod: S$PBB,,, | Performed by: INTERNAL MEDICINE

## 2017-06-06 NOTE — PROGRESS NOTES
Subjective:      Patient ID: Kwesi Garcia is a 66 y.o. male.    Chief Complaint: Pre-op Exam    67 yo with Patient Active Problem List:     Acute pain of left shoulder    History reviewed. No pertinent past medical history.            Here today for preop for left shoulder surgery.  Feeling well and in his usu state of health   . No h/o CAD, CHF, CKD, DM, RA, CVA. Able to walk up at least 2 flights of stairs without cp or dyspnea.     Social History    Marital status:             Spouse name:                       Years of education:                 Number of children:               Occupational History    None on file    Social History Main Topics    Smoking status: Current Every Day Smoker                                                     Packs/day: 1.00      Years: 45.00          Types: Cigarettes    Smokeless tobacco: Never Used                        Alcohol use: Yes           1.8 oz/week       Standard drinks or equivalent: 3 per week       Comment: 3 times a week    Drug use: No              Sexual activity: Not on file          Other Topics            Concern    None on file    Social History Narrative    None on file    Fam: Mom with h/o RA.  Dad  at 73 with MI, alcoholism. Sister with h/o brain cancer.   No f/h of bleeding or clotting d/o    Past Surgical History:  No date: ADENOIDECTOMY  No date: APPENDECTOMY  No date: TONSILLECTOMY  Right arm surgery.     Meds: none  All: ampicillin - rash          Review of Systems   Constitutional: Negative for chills and fever.   HENT: Negative for ear pain and sore throat.    Respiratory: Negative for cough, shortness of breath and wheezing.    Cardiovascular: Negative for chest pain.   Gastrointestinal: Negative for abdominal pain, blood in stool, constipation, nausea and vomiting.   Genitourinary: Negative for dysuria and hematuria.   Skin: Negative for rash.   Neurological: Negative for seizures and syncope.     Objective:   /82 (BP  "Location: Right arm, Patient Position: Sitting)   Pulse 67   Temp 96.8 °F (36 °C) (Tympanic)   Ht 5' 9" (1.753 m)   Wt 70.7 kg (155 lb 13.8 oz)   SpO2 97%   BMI 23.02 kg/m²     Physical Exam   Constitutional: He is oriented to person, place, and time. He appears well-developed and well-nourished. No distress.   HENT:   Head: Normocephalic and atraumatic.   Mouth/Throat: Oropharynx is clear and moist.   Eyes: EOM are normal. Pupils are equal, round, and reactive to light.   Neck: Neck supple. Carotid bruit is not present. No thyromegaly present.   Cardiovascular: Normal rate and regular rhythm.    Pulmonary/Chest: Breath sounds normal. He has no wheezes. He has no rales.   Abdominal: Soft. Bowel sounds are normal. There is no tenderness.   Lymphadenopathy:     He has no cervical adenopathy.   Neurological: He is alert and oriented to person, place, and time.   Skin: Skin is warm and dry.   Psychiatric: He has a normal mood and affect. His behavior is normal.     Lab Visit on 06/02/2017   Component Date Value Ref Range Status    WBC 06/02/2017 6.98  3.90 - 12.70 K/uL Final    RBC 06/02/2017 4.84  4.60 - 6.20 M/uL Final    Hemoglobin 06/02/2017 15.4  14.0 - 18.0 g/dL Final    Hematocrit 06/02/2017 44.6  40.0 - 54.0 % Final    MCV 06/02/2017 92  82 - 98 fL Final    MCH 06/02/2017 31.8* 27.0 - 31.0 pg Final    MCHC 06/02/2017 34.5  32.0 - 36.0 % Final    RDW 06/02/2017 13.0  11.5 - 14.5 % Final    Platelets 06/02/2017 192  150 - 350 K/uL Final    MPV 06/02/2017 9.5  9.2 - 12.9 fL Final    Gran # 06/02/2017 4.4  1.8 - 7.7 K/uL Final    Lymph # 06/02/2017 1.9  1.0 - 4.8 K/uL Final    Mono # 06/02/2017 0.4  0.3 - 1.0 K/uL Final    Eos # 06/02/2017 0.3  0.0 - 0.5 K/uL Final    Baso # 06/02/2017 0.04  0.00 - 0.20 K/uL Final    Gran% 06/02/2017 62.5  38.0 - 73.0 % Final    Lymph% 06/02/2017 26.9  18.0 - 48.0 % Final    Mono% 06/02/2017 5.7  4.0 - 15.0 % Final    Eosinophil% 06/02/2017 4.3  0.0 - 8.0 % " Final    Basophil% 06/02/2017 0.6  0.0 - 1.9 % Final    Differential Method 06/02/2017 Automated   Final    Sodium 06/02/2017 134* 136 - 145 mmol/L Final    Potassium 06/02/2017 3.8  3.5 - 5.1 mmol/L Final    Chloride 06/02/2017 100  95 - 110 mmol/L Final    CO2 06/02/2017 24  23 - 29 mmol/L Final    Glucose 06/02/2017 126* 70 - 110 mg/dL Final    BUN, Bld 06/02/2017 8  8 - 23 mg/dL Final    Creatinine 06/02/2017 0.9  0.5 - 1.4 mg/dL Final    Calcium 06/02/2017 9.6  8.7 - 10.5 mg/dL Final    Total Protein 06/02/2017 8.4  6.0 - 8.4 g/dL Final    Albumin 06/02/2017 4.2  3.5 - 5.2 g/dL Final    Total Bilirubin 06/02/2017 0.6  0.1 - 1.0 mg/dL Final    Comment: For infants and newborns, interpretation of results should be based  on gestational age, weight and in agreement with clinical  observations.  Premature Infant recommended reference ranges:  Up to 24 hours.............<8.0 mg/dL  Up to 48 hours............<12.0 mg/dL  3-5 days..................<15.0 mg/dL  6-29 days.................<15.0 mg/dL      Alkaline Phosphatase 06/02/2017 90  55 - 135 U/L Final    AST 06/02/2017 17  10 - 40 U/L Final    ALT 06/02/2017 10  10 - 44 U/L Final    Anion Gap 06/02/2017 10  8 - 16 mmol/L Final    eGFR if African American 06/02/2017 >60  >60 mL/min/1.73 m^2 Final    eGFR if non African American 06/02/2017 >60  >60 mL/min/1.73 m^2 Final    Comment: Calculation used to obtain the estimated glomerular filtration  rate (eGFR) is the CKD-EPI equation. Since race is unknown   in our information system, the eGFR values for   -American and Non--American patients are given   for each creatinine result.     Lab Visit on 06/02/2017   Component Date Value Ref Range Status    Specimen UA 06/02/2017 Urine, Clean Catch   Final    Color, UA 06/02/2017 Straw  Yellow, Straw, Maureen Final    Appearance, UA 06/02/2017 Clear  Clear Final    pH, UA 06/02/2017 6.0  5.0 - 8.0 Final    Specific Gravity, UA 06/02/2017  <=1.005* 1.005 - 1.030 Final    Protein, UA 06/02/2017 Negative  Negative Final    Comment: Recommend a 24 hour urine protein or a urine   protein/creatinine ratio if globulin induced proteinuria is  clinically suspected.      Glucose, UA 06/02/2017 Negative  Negative Final    Ketones, UA 06/02/2017 Negative  Negative Final    Bilirubin (UA) 06/02/2017 Negative  Negative Final    Occult Blood UA 06/02/2017 Negative  Negative Final    Nitrite, UA 06/02/2017 Negative  Negative Final    Leukocytes, UA 06/02/2017 1+* Negative Final    RBC, UA 06/02/2017 2  0 - 4 /hpf Final    WBC, UA 06/02/2017 12* 0 - 5 /hpf Final    Bacteria, UA 06/02/2017 Rare  None-Occ /hpf Final    Squam Epithel, UA 06/02/2017 5  /hpf Final    Microscopic Comment 06/02/2017 SEE COMMENT   Final    Comment: Other formed elements not mentioned in the report are not   present in the microscopic examination.        EKG with nsr    cxr without infiltrates, consolidations, or masses    Assessment:     1. Comminuted left humeral fracture, closed, initial encounter    2. Preop examination    3. Smoker      Plan:   Comminuted left humeral fracture, closed, initial encounter    Preop examination    Smoker    advised smoking cessation    Pt is at low risk for perioperative cardiac event for his left shoulder surgery.         Return if symptoms worsen or fail to improve.

## 2017-06-06 NOTE — TELEPHONE ENCOUNTER
----- Message from Mark Cummins sent at 6/6/2017 10:10 AM CDT -----  Contact: Pt   States he is rtn nurses call and can be reached at [phone] //thanks/dbw

## 2017-06-06 NOTE — TELEPHONE ENCOUNTER
Contacted pt to move surgery to tomorrow.  Pt agreed. Informed pt that he would need to schedule a pcp clearance today in order to have surgery on tomorrow.  Pt stated he would give me a call back once he got home.    Pt needs pcp clearance scheduled...also call surgery and move pt to 6/7/2017 to follow 1st case

## 2017-06-06 NOTE — LETTER
June 6, 2017      Chavez Rollins Sr., MD  [address] [place] [address]           [facility]  [address] [place] [address]  Phone: [phone]  Fax: [phone]          Patient: Kwesi Garcia   MR Number: [id number]   YOB: 1950   Date of Visit: 6/6/2017       Dear Dr. Chavez Rollins Sr.:    Thank you for referring Kwesi Garcia to me for evaluation. Attached you will find relevant portions of my assessment and plan of care.    If you have questions, please do not hesitate to call me. I look forward to following Kwesi Garcia along with you.    Sincerely,    Mitul Reddy MD    Enclosure  CC:  No Recipients    If you would like to receive this communication electronically, please contact [email or web address] or [phone] to request more information on [employer] Link access.    For providers and/or their staff who would like to refer a patient to [facility], please contact us through our one-stop-shop provider referral line, [facility], at [phone].    If you feel you have received this communication in error or would no longer like to receive these types of communications, please e-mail [email or web address]

## 2017-06-07 ENCOUNTER — HOSPITAL ENCOUNTER (INPATIENT)
Facility: HOSPITAL | Age: 67
LOS: 1 days | Discharge: HOME | DRG: 483 | End: 2017-06-08
Attending: ORTHOPAEDIC SURGERY | Admitting: ORTHOPAEDIC SURGERY
Payer: MEDICARE

## 2017-06-07 ENCOUNTER — ANESTHESIA EVENT (OUTPATIENT)
Dept: SURGERY | Facility: HOSPITAL | Age: 67
DRG: 483 | End: 2017-06-07
Payer: MEDICARE

## 2017-06-07 ENCOUNTER — ANESTHESIA (OUTPATIENT)
Dept: SURGERY | Facility: HOSPITAL | Age: 67
DRG: 483 | End: 2017-06-07
Payer: MEDICARE

## 2017-06-07 DIAGNOSIS — M25.512 ACUTE PAIN OF LEFT SHOULDER: ICD-10-CM

## 2017-06-07 DIAGNOSIS — Z98.890 POST-OPERATIVE STATE: ICD-10-CM

## 2017-06-07 DIAGNOSIS — M24.512 CONTRACTURE, LEFT SHOULDER: Primary | ICD-10-CM

## 2017-06-07 LAB
ABO + RH BLD: NORMAL
BLD GP AB SCN CELLS X3 SERPL QL: NORMAL

## 2017-06-07 PROCEDURE — 86920 COMPATIBILITY TEST SPIN: CPT

## 2017-06-07 PROCEDURE — 25000003 PHARM REV CODE 250: Performed by: ORTHOPAEDIC SURGERY

## 2017-06-07 PROCEDURE — 63600175 PHARM REV CODE 636 W HCPCS: Performed by: ANESTHESIOLOGY

## 2017-06-07 PROCEDURE — 63600175 PHARM REV CODE 636 W HCPCS: Performed by: NURSE ANESTHETIST, CERTIFIED REGISTERED

## 2017-06-07 PROCEDURE — 11000001 HC ACUTE MED/SURG PRIVATE ROOM

## 2017-06-07 PROCEDURE — 37000009 HC ANESTHESIA EA ADD 15 MINS: Performed by: ORTHOPAEDIC SURGERY

## 2017-06-07 PROCEDURE — 0MB20ZZ EXCISION OF LEFT SHOULDER BURSA AND LIGAMENT, OPEN APPROACH: ICD-10-PCS | Performed by: ORTHOPAEDIC SURGERY

## 2017-06-07 PROCEDURE — 27201423 OPTIME MED/SURG SUP & DEVICES STERILE SUPPLY: Performed by: ORTHOPAEDIC SURGERY

## 2017-06-07 PROCEDURE — 0RRK0J6 REPLACEMENT OF LEFT SHOULDER JOINT WITH SYNTHETIC SUBSTITUTE, HUMERAL SURFACE, OPEN APPROACH: ICD-10-PCS | Performed by: ORTHOPAEDIC SURGERY

## 2017-06-07 PROCEDURE — 23470 RECONSTRUCT SHOULDER JOINT: CPT | Mod: AS,LT,, | Performed by: PHYSICIAN ASSISTANT

## 2017-06-07 PROCEDURE — 63600175 PHARM REV CODE 636 W HCPCS: Performed by: ORTHOPAEDIC SURGERY

## 2017-06-07 PROCEDURE — 37000008 HC ANESTHESIA 1ST 15 MINUTES: Performed by: ORTHOPAEDIC SURGERY

## 2017-06-07 PROCEDURE — 23470 RECONSTRUCT SHOULDER JOINT: CPT | Mod: LT,,, | Performed by: ORTHOPAEDIC SURGERY

## 2017-06-07 PROCEDURE — 25000003 PHARM REV CODE 250: Performed by: NURSE ANESTHETIST, CERTIFIED REGISTERED

## 2017-06-07 PROCEDURE — 86850 RBC ANTIBODY SCREEN: CPT

## 2017-06-07 PROCEDURE — 88305 TISSUE EXAM BY PATHOLOGIST: CPT | Mod: 26,,, | Performed by: PATHOLOGY

## 2017-06-07 PROCEDURE — 88305 TISSUE EXAM BY PATHOLOGIST: CPT | Performed by: PATHOLOGY

## 2017-06-07 PROCEDURE — 86900 BLOOD TYPING SEROLOGIC ABO: CPT

## 2017-06-07 PROCEDURE — 0RNK0ZZ RELEASE LEFT SHOULDER JOINT, OPEN APPROACH: ICD-10-PCS | Performed by: ORTHOPAEDIC SURGERY

## 2017-06-07 PROCEDURE — C1776 JOINT DEVICE (IMPLANTABLE): HCPCS | Performed by: ORTHOPAEDIC SURGERY

## 2017-06-07 PROCEDURE — 88311 DECALCIFY TISSUE: CPT | Mod: 26,,, | Performed by: PATHOLOGY

## 2017-06-07 PROCEDURE — 71000033 HC RECOVERY, INTIAL HOUR: Performed by: ORTHOPAEDIC SURGERY

## 2017-06-07 PROCEDURE — 36000710: Performed by: ORTHOPAEDIC SURGERY

## 2017-06-07 PROCEDURE — 36000711: Performed by: ORTHOPAEDIC SURGERY

## 2017-06-07 DEVICE — IMPLANTABLE DEVICE: Type: IMPLANTABLE DEVICE | Site: SHOULDER | Status: FUNCTIONAL

## 2017-06-07 RX ORDER — MUPIROCIN 20 MG/G
1 OINTMENT TOPICAL
Status: COMPLETED | OUTPATIENT
Start: 2017-06-07 | End: 2017-06-07

## 2017-06-07 RX ORDER — SODIUM CHLORIDE 9 MG/ML
3 INJECTION, SOLUTION INTRAMUSCULAR; INTRAVENOUS; SUBCUTANEOUS
Status: DISCONTINUED | OUTPATIENT
Start: 2017-06-07 | End: 2017-06-07 | Stop reason: HOSPADM

## 2017-06-07 RX ORDER — ZOLPIDEM TARTRATE 5 MG/1
5 TABLET ORAL NIGHTLY PRN
Status: DISCONTINUED | OUTPATIENT
Start: 2017-06-07 | End: 2017-06-08 | Stop reason: HOSPADM

## 2017-06-07 RX ORDER — PROPOFOL 10 MG/ML
VIAL (ML) INTRAVENOUS
Status: DISCONTINUED | OUTPATIENT
Start: 2017-06-07 | End: 2017-06-07

## 2017-06-07 RX ORDER — FENTANYL CITRATE 50 UG/ML
25 INJECTION, SOLUTION INTRAMUSCULAR; INTRAVENOUS EVERY 5 MIN PRN
Status: COMPLETED | OUTPATIENT
Start: 2017-06-07 | End: 2017-06-07

## 2017-06-07 RX ORDER — OXYCODONE AND ACETAMINOPHEN 10; 325 MG/1; MG/1
1 TABLET ORAL EVERY 4 HOURS PRN
Qty: 90 TABLET | Refills: 0 | Status: SHIPPED | OUTPATIENT
Start: 2017-06-07 | End: 2017-09-22 | Stop reason: ALTCHOICE

## 2017-06-07 RX ORDER — ROCURONIUM BROMIDE 10 MG/ML
INJECTION, SOLUTION INTRAVENOUS
Status: DISCONTINUED | OUTPATIENT
Start: 2017-06-07 | End: 2017-06-07

## 2017-06-07 RX ORDER — SUCCINYLCHOLINE CHLORIDE 20 MG/ML
INJECTION INTRAMUSCULAR; INTRAVENOUS
Status: DISCONTINUED | OUTPATIENT
Start: 2017-06-07 | End: 2017-06-07

## 2017-06-07 RX ORDER — CEFAZOLIN SODIUM 1 G/3ML
INJECTION, POWDER, FOR SOLUTION INTRAMUSCULAR; INTRAVENOUS
Status: DISCONTINUED | OUTPATIENT
Start: 2017-06-07 | End: 2017-06-07 | Stop reason: HOSPADM

## 2017-06-07 RX ORDER — LIDOCAINE HCL/PF 100 MG/5ML
SYRINGE (ML) INTRAVENOUS
Status: DISCONTINUED | OUTPATIENT
Start: 2017-06-07 | End: 2017-06-07

## 2017-06-07 RX ORDER — ACETAMINOPHEN 10 MG/ML
INJECTION, SOLUTION INTRAVENOUS
Status: DISCONTINUED | OUTPATIENT
Start: 2017-06-07 | End: 2017-06-07

## 2017-06-07 RX ORDER — GLYCOPYRROLATE 0.2 MG/ML
INJECTION INTRAMUSCULAR; INTRAVENOUS
Status: DISCONTINUED | OUTPATIENT
Start: 2017-06-07 | End: 2017-06-07

## 2017-06-07 RX ORDER — MEPERIDINE HYDROCHLORIDE 50 MG/ML
12.5 INJECTION INTRAMUSCULAR; INTRAVENOUS; SUBCUTANEOUS ONCE AS NEEDED
Status: DISCONTINUED | OUTPATIENT
Start: 2017-06-07 | End: 2017-06-07 | Stop reason: HOSPADM

## 2017-06-07 RX ORDER — NEOMYCIN AND POLYMYXIN B SULFATES 40; 200000 MG/ML; [USP'U]/ML
SOLUTION IRRIGATION
Status: DISCONTINUED | OUTPATIENT
Start: 2017-06-07 | End: 2017-06-07 | Stop reason: HOSPADM

## 2017-06-07 RX ORDER — BACITRACIN 50000 [IU]/1
INJECTION, POWDER, FOR SOLUTION INTRAMUSCULAR
Status: DISCONTINUED | OUTPATIENT
Start: 2017-06-07 | End: 2017-06-07 | Stop reason: HOSPADM

## 2017-06-07 RX ORDER — MORPHINE SULFATE 2 MG/ML
2 INJECTION, SOLUTION INTRAMUSCULAR; INTRAVENOUS EVERY 4 HOURS PRN
Status: DISCONTINUED | OUTPATIENT
Start: 2017-06-07 | End: 2017-06-08 | Stop reason: HOSPADM

## 2017-06-07 RX ORDER — MIDAZOLAM HYDROCHLORIDE 1 MG/ML
INJECTION, SOLUTION INTRAMUSCULAR; INTRAVENOUS
Status: DISCONTINUED | OUTPATIENT
Start: 2017-06-07 | End: 2017-06-07

## 2017-06-07 RX ORDER — DEXAMETHASONE SODIUM PHOSPHATE 4 MG/ML
INJECTION, SOLUTION INTRA-ARTICULAR; INTRALESIONAL; INTRAMUSCULAR; INTRAVENOUS; SOFT TISSUE
Status: DISCONTINUED | OUTPATIENT
Start: 2017-06-07 | End: 2017-06-07

## 2017-06-07 RX ORDER — FENTANYL CITRATE 50 UG/ML
INJECTION, SOLUTION INTRAMUSCULAR; INTRAVENOUS
Status: DISCONTINUED | OUTPATIENT
Start: 2017-06-07 | End: 2017-06-07

## 2017-06-07 RX ORDER — NEOSTIGMINE METHYLSULFATE 1 MG/ML
INJECTION, SOLUTION INTRAVENOUS
Status: DISCONTINUED | OUTPATIENT
Start: 2017-06-07 | End: 2017-06-07

## 2017-06-07 RX ORDER — SODIUM CHLORIDE, SODIUM LACTATE, POTASSIUM CHLORIDE, CALCIUM CHLORIDE 600; 310; 30; 20 MG/100ML; MG/100ML; MG/100ML; MG/100ML
INJECTION, SOLUTION INTRAVENOUS CONTINUOUS PRN
Status: DISCONTINUED | OUTPATIENT
Start: 2017-06-07 | End: 2017-06-07

## 2017-06-07 RX ORDER — HYDROMORPHONE HYDROCHLORIDE 2 MG/ML
0.2 INJECTION, SOLUTION INTRAMUSCULAR; INTRAVENOUS; SUBCUTANEOUS EVERY 5 MIN PRN
Status: DISCONTINUED | OUTPATIENT
Start: 2017-06-07 | End: 2017-06-07 | Stop reason: HOSPADM

## 2017-06-07 RX ORDER — HYDROCODONE BITARTRATE AND ACETAMINOPHEN 500; 5 MG/1; MG/1
TABLET ORAL
Status: DISCONTINUED | OUTPATIENT
Start: 2017-06-07 | End: 2017-06-07 | Stop reason: HOSPADM

## 2017-06-07 RX ORDER — SODIUM CHLORIDE 9 MG/ML
INJECTION, SOLUTION INTRAVENOUS CONTINUOUS
Status: DISCONTINUED | OUTPATIENT
Start: 2017-06-07 | End: 2017-06-08 | Stop reason: HOSPADM

## 2017-06-07 RX ORDER — OXYCODONE HCL 10 MG/1
10 TABLET, FILM COATED, EXTENDED RELEASE ORAL EVERY 12 HOURS
Status: DISCONTINUED | OUTPATIENT
Start: 2017-06-07 | End: 2017-06-08 | Stop reason: HOSPADM

## 2017-06-07 RX ORDER — ONDANSETRON 2 MG/ML
INJECTION INTRAMUSCULAR; INTRAVENOUS
Status: DISCONTINUED | OUTPATIENT
Start: 2017-06-07 | End: 2017-06-07

## 2017-06-07 RX ADMIN — OXYCODONE HYDROCHLORIDE 10 MG: 10 TABLET, FILM COATED, EXTENDED RELEASE ORAL at 09:06

## 2017-06-07 RX ADMIN — LIDOCAINE HYDROCHLORIDE 40 MG: 20 INJECTION, SOLUTION INTRAVENOUS at 12:06

## 2017-06-07 RX ADMIN — ROCURONIUM BROMIDE 10 MG: 10 INJECTION, SOLUTION INTRAVENOUS at 01:06

## 2017-06-07 RX ADMIN — ONDANSETRON 4 MG: 2 INJECTION, SOLUTION INTRAMUSCULAR; INTRAVENOUS at 03:06

## 2017-06-07 RX ADMIN — DEXAMETHASONE SODIUM PHOSPHATE 4 MG: 4 INJECTION, SOLUTION INTRA-ARTICULAR; INTRALESIONAL; INTRAMUSCULAR; INTRAVENOUS; SOFT TISSUE at 01:06

## 2017-06-07 RX ADMIN — SODIUM CHLORIDE, SODIUM LACTATE, POTASSIUM CHLORIDE, AND CALCIUM CHLORIDE: 600; 310; 30; 20 INJECTION, SOLUTION INTRAVENOUS at 12:06

## 2017-06-07 RX ADMIN — PROPOFOL 150 MG: 10 INJECTION, EMULSION INTRAVENOUS at 12:06

## 2017-06-07 RX ADMIN — FENTANYL CITRATE 25 MCG: 50 INJECTION INTRAMUSCULAR; INTRAVENOUS at 03:06

## 2017-06-07 RX ADMIN — SUCCINYLCHOLINE CHLORIDE 100 MG: 20 INJECTION, SOLUTION INTRAMUSCULAR; INTRAVENOUS at 12:06

## 2017-06-07 RX ADMIN — FENTANYL CITRATE 50 MCG: 50 INJECTION, SOLUTION INTRAMUSCULAR; INTRAVENOUS at 12:06

## 2017-06-07 RX ADMIN — MIDAZOLAM HYDROCHLORIDE 2 MG: 1 INJECTION, SOLUTION INTRAMUSCULAR; INTRAVENOUS at 12:06

## 2017-06-07 RX ADMIN — BUPIVACAINE 266 MG: 13.3 INJECTION, SUSPENSION, LIPOSOMAL INFILTRATION at 02:06

## 2017-06-07 RX ADMIN — SODIUM CHLORIDE, SODIUM LACTATE, POTASSIUM CHLORIDE, AND CALCIUM CHLORIDE: 600; 310; 30; 20 INJECTION, SOLUTION INTRAVENOUS at 01:06

## 2017-06-07 RX ADMIN — MUPIROCIN 1 G: 20 OINTMENT TOPICAL at 12:06

## 2017-06-07 RX ADMIN — ROCURONIUM BROMIDE 5 MG: 10 INJECTION, SOLUTION INTRAVENOUS at 12:06

## 2017-06-07 RX ADMIN — GLYCOPYRROLATE 0.6 MG: 0.2 INJECTION, SOLUTION INTRAMUSCULAR; INTRAVENOUS at 03:06

## 2017-06-07 RX ADMIN — SODIUM CHLORIDE: 0.9 INJECTION, SOLUTION INTRAVENOUS at 04:06

## 2017-06-07 RX ADMIN — Medication 1000 MG: at 12:06

## 2017-06-07 RX ADMIN — ROCURONIUM BROMIDE 25 MG: 10 INJECTION, SOLUTION INTRAVENOUS at 12:06

## 2017-06-07 RX ADMIN — ACETAMINOPHEN 1000 MG: 10 INJECTION, SOLUTION INTRAVENOUS at 02:06

## 2017-06-07 RX ADMIN — NEOSTIGMINE METHYLSULFATE 4 MG: 1 INJECTION INTRAVENOUS at 03:06

## 2017-06-07 NOTE — ANESTHESIA PREPROCEDURE EVALUATION
06/07/2017  Kwesi Garcia is a 66 y.o., male.    Anesthesia Evaluation    I have reviewed the Patient Summary Reports.    I have reviewed the Nursing Notes.   I have reviewed the Medications.     Review of Systems  Anesthesia Hx:  No problems with previous Anesthesia  Denies Family Hx of Anesthesia complications.   Denies Personal Hx of Anesthesia complications.   Social:  Smoker    Hematology/Oncology:  Hematology Normal   Oncology Normal     EENT/Dental:EENT/Dental Normal   Cardiovascular:  Cardiovascular Normal Exercise tolerance: good  ECG has been reviewed.    Pulmonary:  Pulmonary Normal    Renal/:  Renal/ Normal     Hepatic/GI:  Hepatic/GI Normal    Musculoskeletal:  Musculoskeletal Normal    Neurological:  Neurology Normal    Endocrine:  Endocrine Normal    Dermatological:  Skin Normal    Psych:  Psychiatric Normal           Physical Exam  General:  Well nourished    Airway/Jaw/Neck:  Airway Findings: Mouth Opening: Normal Tongue: Normal  General Airway Assessment: Adult, Average  Mallampati: II  TM Distance: Normal, at least 6 cm      Dental:  Dental Findings: In tact   Chest/Lungs:  Chest/Lungs Findings: Clear to auscultation, Normal Respiratory Rate     Heart/Vascular:  Heart Findings: Rate: Normal  Rhythm: Regular Rhythm  Sounds: Normal        Mental Status:  Mental Status Findings:  Cooperative, Alert and Oriented         Anesthesia Plan  Type of Anesthesia, risks & benefits discussed:  Anesthesia Type:  general  Patient's Preference:   Intra-op Monitoring Plan:   Intra-op Monitoring Plan Comments:   Post Op Pain Control Plan:   Post Op Pain Control Plan Comments:   Induction:   IV  Beta Blocker:  Patient is not currently on a Beta-Blocker (No further documentation required).       Informed Consent: Patient understands risks and agrees with Anesthesia plan.  Questions answered. Anesthesia  consent signed with patient.  ASA Score: 2     Day of Surgery Review of History & Physical: I have interviewed and examined the patient. I have reviewed the patient's H&P dated: 6/7/17. There are no significant changes.  H&P update referred to the surgeon.         Ready For Surgery From Anesthesia Perspective.

## 2017-06-07 NOTE — PLAN OF CARE
Problem: Patient Care Overview  Goal: Plan of Care Review  Outcome: Ongoing (interventions implemented as appropriate)  Fall precautions maintained, pt free from injuries/fall, assisted in repositioning, left shoulder immobilizer in place. Dressing clean and dry. POC and meds reviewed w/ family and pt, verbalizes understanding. Chart check done. Will cont to monitor.

## 2017-06-07 NOTE — OP NOTE
OPERATIVE DICTATION:    DATE OF SERVICE:June 7, 2017      Preoperative Diagnosis: left shoulder contracture and humeral head fracture/deformity    Postoperative Diagnosis:   left shoulder contracture and humeral head fracture/deformity     Procedure: left shoulder hemiarthroplasty and capsulotomy/contracture release    Indication for surgery:  left shoulder contracture and humeral head fracture/deformity     Anesthesia:  General     Complications:  none    Surgeon: Chavez Rollins M.D.     Specimen:bone    Assistant:EFREN Wheeler      Operative procedure:  The patient brought to operating room after appropriate consent and placed under general anesthesia with intubation.  The patient placed in a beachchair position.  The left shoulder prepped with alcohol chlorhexidine and sterilely draped.  The timeout was performed and the correct extremity was identified.  An Ioban was placed over the left shoulder soft tissue and skin.  A 7 cm incision was made from the before meals joint to the lateral deltoid tubercle.  The dissection was carried down to the deltopectoral interval.  The cephalic vein was sutured at the level of the clavicle the deltoid tendon was then incised and reflected from the clavicle and the acromion.  The noted to have a fulminant subdeltoid and subacromial bursitis which was excised.  The rotator cuff noted to be intact. The subscapularis was incised. The biceps tendon was preserved.  Patient noted to have a severe posterior contracture of the shoulder joint.  The elevator was used to perform acapsulotomy posterior An inferior to the glenoid.  The humeral head was delivered and noted to be quite deformed.  The humeral head cutting guide was placed and the humeral head was resected.  The reaming was performed up to a size 12.  The impression was made with the humeral prosthesis compression device. The size 12 humeral component placed with a 21 mm neck and 44 mm head.  The prosthesis was reduced  and noticed to be satisfactorily positioned. A pulse lavage was performed.  The permanent size 12 humeral device was placed along with a 21 mm neck and a 44 mm head.  The shoulder joint was reduced.  Operative x-ray showed good alignment of the prosthesis.  The subscapularis was then repaired using interrupted #1 Vicryl sutures and reinforced with #5 Tycron sutures.The deltoid tendon was repaired using interrupted #1 Vicryl sutures. Subcutaneous tissue opposed with #1 Vicyl sutures.  A subcuticular 3-0 Prolene suture placed and the overlying skin was asked with Dermabond.  Gauze placed over the incision site and the sponge tape applied. The patient was immobilized in a shoulder immobilizer.  The patient tolerated the procedure well and left the operating room in good condition.               Chavez Rollins M.D.

## 2017-06-07 NOTE — H&P
"CC: 66-year-old male left shoulder pain  Date of injury; March 1, 2017,      HPI: The patient is at home, he tripped over his daughter and let us left shoulder.  He reported emergency room for evaluation where he was diagnosed with a fractured humeral head.  He's tried to let this heal conservatively however this point his range of motion is decreasing he's had increased pain and his affect his ADLs.     PMH:  History reviewed. No pertinent past medical history.     PSH:          Past Surgical History:   Procedure Laterality Date    ADENOIDECTOMY        APPENDECTOMY        TONSILLECTOMY             Family Hx:  History reviewed. No pertinent family history.     Allergy:         Review of patient's allergies indicates:   Allergen Reactions    Ampicillin           Medication:    Current Outpatient Prescriptions:     hydrocodone-acetaminophen 10-325mg (NORCO)  mg Tab, Take 1 tablet by mouth every 4 (four) hours as needed for Pain., Disp: 18 tablet, Rfl: 0    meloxicam (MOBIC) 15 MG tablet, Take 1 tablet (15 mg total) by mouth once daily., Disp: 30 tablet, Rfl: 0     Social History:     Social History    Social History            Social History    Marital status:        Spouse name: N/A    Number of children: N/A    Years of education: N/A          Occupational History    Not on file.              Social History Main Topics    Smoking status: Current Every Day Smoker       Packs/day: 0.50       Types: Cigarettes    Smokeless tobacco: Never Used    Alcohol use 1.8 oz/week        3 Standard drinks or equivalent per week          Comment: 3 times a week    Drug use: No    Sexual activity: Not on file           Other Topics Concern    Not on file      Social History Narrative         BP (!) 150/76 (BP Location: Right arm, Patient Position: Sitting, BP Method: Automatic)   Pulse 63   Temp 98.1 °F (36.7 °C) (Temporal)   Resp 18   Ht 5' 9" (1.753 m)   Wt 69.6 kg (153 lb 7 oz)   SpO2 98%   " BMI 22.66 kg/m²         ROS:  GENERAL: No fever, chills, fatigability or weight loss.  SKIN: No rashes, itching or changes in color or texture of skin.  HEAD: No headaches or recent head trauma.  EYES: Visual acuity fine. No photophobia, ocular pain or diplopia.  EARS: Denies ear pain, discharge or vertigo.  NOSE: No loss of smell, no epistaxis or postnasal drip.  MOUTH & THROAT: No hoarseness or change in voice. No excessive gum bleeding.  NODES: Denies swollen glands.  CHEST: Denies PARSONS, cyanosis, wheezing, cough and sputum production.  CARDIOVASCULAR: Denies chest pain, PND, orthopnea or reduced exercise tolerance.  ABDOMEN: Appetite fine. No weight loss. Denies diarrhea, abdominal pain, hematemesis or blood in stool.  URINARY: No flank pain, dysuria or hematuria.  PERIPHERAL VASCULAR: No claudication or cyanosis.  NEUROLOGIC: No history of seizures, paralysis, alteration of gait or coordination.  MUSCULOSKELETAL: See HPI     PE:  APPEARANCE: Well nourished, well developed, in no acute distress.   HEAD: Normocephalic, atraumatic.  NEUROLOGIC: Cranial Nerves: II-XII grossly intact, also see MUSCULOSKELETAL  MUSCULOSKELETAL: left Shoulder Exam     Muscle Appearance:Abnormal   Grooming:Normal  Spine Alignment-Normal  Muscle Atrophy-Yes  Deformities-Yes  Tenderness-Yes  Paresthesias-No  Range of Motion-decreased         Abd Pure-decreased         Abd Combined-decreased         Ext-decreased         Flex-decreased         Internal Rot-decreased         External Rot-decreased  Muscle Strength-decreased  Sensation-normal  Reflexes-Normal  Crepitus-Yes  Impingement-Yes  Apprehension-Yes  Fatigue-Yes  Scapular Winging-No  Muscle Tightness-Yes  Instability-Yes  Tests on Exam-no evidence of instability  Neurovascular Status-Normal  Skin-Normal  Positive Drop Arm testYes     Assessment:            Diagnosis:              1.  Left shoulder four-part humeral head fracture               2.  Internal left shoulder  derangement     Diagnostic Studies  MRI-Yes, There are avulsion fractures associated with the attachment of the supraspinatus, infraspinatus, teres minor, and subscapularis tendons on the humeral head.    2.  There is a complete transverse fracture through the left humeral neck.  The main portion of the left humeral head is displaced laterally by up to 14 mm.   3.  There is a large amount of fluid in the left glenohumeral joint that communicates with the subacromial/subdeltoid bursa.    4.  There are minimal osteoarthritic changes in the left acromioclavicular joint with no significant downward proliferation.  There is a type II acromion process with mild lateral downsloping.  X-Ray-Yes     CT Scan-Yes, Findings: There is a comminuted fracture involving the head/neck of the humerus that involvement of the articular surface of the humeral head medially and posteriorly.  The humeral shaft is displaced medially in relation to the humeral head with significant impaction noted at the fracture site.  There is comminution in the region of the greater tuberosity as well.  The articular surface of the humeral head is inferiorly subluxed in relation to the glenoid.  The a.c. joint is unremarkable in appearance.           Plan:                                                  1. PT-no                                                 2.OT-no                                          3.NSAID-no                                        4. Narcotics-no                                     5. Wound care-N/A                                 6. Rest-no                                           7. Surgery-yes  left reverse shoulder after failing conservative treatment                                     8. EDWIN Hose-no                                    9. Anticoagulation therapy-no               10. Elevation-no                                     11. Crutches-no                                    12. Walker-no             13. Cane no                         14. Referral-no                                     15.Injection-no                            16. Splint   /    Cast   /   Cast Shoe-No              17. RICE            18. Follow up-  all the patient's questions and concern were answered great detail

## 2017-06-07 NOTE — ANESTHESIA POSTPROCEDURE EVALUATION
"Anesthesia Post Evaluation    Patient: Kwesi Garcia    Procedure(s) Performed: Procedure(s) (LRB):  ARTHROPLASTY-SHOULDER-LAKSHMI (Left)    Final Anesthesia Type: general  Patient location during evaluation: PACU  Patient participation: Yes- Able to Participate  Level of consciousness: awake and alert  Post-procedure vital signs: reviewed and stable  Pain management: adequate  Airway patency: patent  PONV status at discharge: No PONV  Anesthetic complications: no      Cardiovascular status: blood pressure returned to baseline  Respiratory status: unassisted and spontaneous ventilation  Hydration status: euvolemic  Follow-up not needed.        Visit Vitals  /71 (BP Location: Right arm, Patient Position: Lying, BP Method: Automatic)   Pulse 65   Temp 36.5 °C (97.7 °F) (Oral)   Resp 18   Ht 5' 9" (1.753 m)   Wt 69.6 kg (153 lb 7 oz)   SpO2 95%   BMI 22.66 kg/m²       Pain/Jose Cruz Score: Pain Assessment Performed: Yes (6/7/2017  4:11 PM)  Presence of Pain: non-verbal indicators absent (6/7/2017  4:11 PM)  Pain Rating Prior to Med Admin: 3 (6/7/2017  4:00 PM)  Jose Cruz Score: 9 (6/7/2017  4:00 PM)      "

## 2017-06-07 NOTE — TRANSFER OF CARE
"Anesthesia Transfer of Care Note    Patient: Kwesi Garcia    Procedure(s) Performed: Procedure(s) (LRB):  ARTHROPLASTY-SHOULDER-LAKSHMI (Left)    Patient location: PACU    Anesthesia Type: general    Transport from OR: Transported from OR on room air with adequate spontaneous ventilation    Post pain: adequate analgesia    Post assessment: no apparent anesthetic complications    Post vital signs: stable    Level of consciousness: responds to stimulation    Nausea/Vomiting: no nausea/vomiting    Complications: none    Transfer of care protocol was followed      Last vitals:   Visit Vitals  BP (!) 150/76 (BP Location: Right arm, Patient Position: Sitting, BP Method: Automatic)   Pulse 63   Temp 36.7 °C (98.1 °F) (Temporal)   Resp 18   Ht 5' 9" (1.753 m)   Wt 69.6 kg (153 lb 7 oz)   SpO2 98%   BMI 22.66 kg/m²     "

## 2017-06-07 NOTE — ANESTHESIA RELEASE NOTE
"Anesthesia Release from PACU Note    Patient: Kwesi Garcia    Procedure(s) Performed: Procedure(s) (LRB):  ARTHROPLASTY-SHOULDER-LAKSHMI (Left)    Anesthesia type: general    Post pain: Adequate analgesia    Post assessment: no apparent anesthetic complications, tolerated procedure well and no evidence of recall    Last Vitals:   Visit Vitals  BP (!) 150/76 (BP Location: Right arm, Patient Position: Sitting, BP Method: Automatic)   Pulse 63   Temp 36.7 °C (98.1 °F) (Temporal)   Resp 18   Ht 5' 9" (1.753 m)   Wt 69.6 kg (153 lb 7 oz)   SpO2 98%   BMI 22.66 kg/m²       Post vital signs: stable    Level of consciousness: responds to stimulation    Nausea/Vomiting: no nausea/no vomiting    Complications: none    Airway Patency: patent    Respiratory: unassisted    Cardiovascular: stable and blood pressure at baseline    Hydration: euvolemic  "

## 2017-06-08 VITALS
WEIGHT: 153.44 LBS | SYSTOLIC BLOOD PRESSURE: 119 MMHG | DIASTOLIC BLOOD PRESSURE: 69 MMHG | OXYGEN SATURATION: 96 % | TEMPERATURE: 98 F | RESPIRATION RATE: 16 BRPM | HEART RATE: 90 BPM | HEIGHT: 69 IN | BODY MASS INDEX: 22.73 KG/M2

## 2017-06-08 PROCEDURE — 97116 GAIT TRAINING THERAPY: CPT

## 2017-06-08 PROCEDURE — G8979 MOBILITY GOAL STATUS: HCPCS | Mod: CH

## 2017-06-08 PROCEDURE — 99900035 HC TECH TIME PER 15 MIN (STAT)

## 2017-06-08 PROCEDURE — G8988 SELF CARE GOAL STATUS: HCPCS | Mod: CL

## 2017-06-08 PROCEDURE — 97165 OT EVAL LOW COMPLEX 30 MIN: CPT

## 2017-06-08 PROCEDURE — 25000003 PHARM REV CODE 250: Performed by: ORTHOPAEDIC SURGERY

## 2017-06-08 PROCEDURE — G8987 SELF CARE CURRENT STATUS: HCPCS | Mod: CL

## 2017-06-08 PROCEDURE — G8978 MOBILITY CURRENT STATUS: HCPCS | Mod: CH

## 2017-06-08 PROCEDURE — G8989 SELF CARE D/C STATUS: HCPCS | Mod: CL

## 2017-06-08 PROCEDURE — 97161 PT EVAL LOW COMPLEX 20 MIN: CPT

## 2017-06-08 PROCEDURE — G8980 MOBILITY D/C STATUS: HCPCS | Mod: CH

## 2017-06-08 PROCEDURE — 97535 SELF CARE MNGMENT TRAINING: CPT

## 2017-06-08 PROCEDURE — 63600175 PHARM REV CODE 636 W HCPCS: Performed by: ORTHOPAEDIC SURGERY

## 2017-06-08 RX ADMIN — Medication 1000 MG: at 01:06

## 2017-06-08 RX ADMIN — OXYCODONE HYDROCHLORIDE 10 MG: 10 TABLET, FILM COATED, EXTENDED RELEASE ORAL at 08:06

## 2017-06-08 NOTE — PLAN OF CARE
Problem: Patient Care Overview  Goal: Plan of Care Review  Outcome: Outcome(s) achieved Date Met: 06/08/17  Reviewed and discussed plan of care with patient.  No acute distress noted.  Safety and fall precautions reviewed and discussed with patient.  Patient free from falls.  Pain managed adequately.  Bed in low position and locked.  Call bell and personal items in reach.  Will continue to monitor.

## 2017-06-08 NOTE — PROGRESS NOTES
I spoke to Lupis, who spoke to the ,who states that a 2 night stay is not required for the patient.      Chavez Rollins M.D.

## 2017-06-08 NOTE — NURSING
No acute distress or injury noted to patient.  Patient refused wheelchair assistance, due to writer being in another patient's room.

## 2017-06-08 NOTE — PLAN OF CARE
Problem: Patient Care Overview  Goal: Plan of Care Review  Outcome: Ongoing (interventions implemented as appropriate)  Pt complainied of bed alarm to PCT. Educated pt on importance of bed alarm. Pt verbalized understanding. Vanc administered during shift. Neurovascular checks to LUE  WNL during shift. VSS. Pain controlled well with scheduled Oxy.   Fall precautions in place. Side rails up. Bed locked and low in position. Call light and personal items within reach. 24 hour order chart check completed. Pt educated on medication's side effects. Pt verbalized understanding.   Will continue to monitor.

## 2017-06-08 NOTE — PLAN OF CARE
06/08/17 1028   Discharge Assessment   Assessment Type Discharge Planning Assessment   Confirmed/corrected address and phone number on facesheet? Yes   Assessment information obtained from? Patient;Medical Record   Expected Length of Stay (days) (2)   Communicated expected length of stay with patient/caregiver yes   Prior to hospitilization cognitive status: Alert/Oriented   Prior to hospitalization functional status: Independent   Current cognitive status: Alert/Oriented   Current Functional Status: Independent   Arrived From home or self-care   Lives With alone   Able to Return to Prior Arrangements yes   Is patient able to care for self after discharge? Yes   Patient's perception of discharge disposition home or selfcare   Readmission Within The Last 30 Days no previous admission in last 30 days   Patient currently being followed by outpatient case management? No   Patient currently receives home health services? No   Does the patient currently use HME? No   Equipment Currently Used at Home none   Do you have any problems affording any of your prescribed medications? No   Is the patient taking medications as prescribed? yes   Do you have any financial concerns preventing you from receiving the healthcare you need? No   Does the patient have transportation to healthcare appointments? Yes   Transportation Available car;family or friend will provide   On Dialysis? No   Discharge Plan A Home   Patient/Family In Agreement With Plan yes

## 2017-06-08 NOTE — PT/OT/SLP EVAL
Occupational Therapy  Evaluation    Kwesi Garcia   MRN: [id number]   Admitting Diagnosis: Acute pain of left shoulder    OT Date of Treatment: 06/08/17   OT Start Time: 0905  OT Stop Time: 0930  OT Total Time (min): 25 min    Billable Minutes:  Evaluation 10 MINUTES  Self Care/Home Management 15 MINUTES    Diagnosis: Acute pain of left shoulder       History reviewed. No pertinent past medical history.   Past Surgical History:   Procedure Laterality Date    ADENOIDECTOMY      APPENDECTOMY      right arm ORIF Right     TONSILLECTOMY         Referring physician: DR. QIU  Date referred to OT: 6-8-17    General Precautions: Standard,    Orthopedic Precautions:    Braces: Shoulder abduction brace          Patient History:  Living Environment  Lives With: alone  Living Arrangements: apartment  Home Layout: Able to live on 1st floor  Equipment Currently Used at Home: none    Prior level of function:   Bed Mobility/Transfers: independent  Grooming: independent  Bathing: independent  Upper Body Dressing: independent  Lower Body Dressing: independent  Toileting: independent  Home Management Skills: independent  Driving License: Yes  Occupation: Full time employment  Type of Occupation: [employer]     Dominant hand: right    Subjective:  Communicated with NURSE UGALDE AND [facility] CHART REVIEW prior to session.    Chief Complaint:   Patient/Family stated goals:          Objective:       Cognitive Exam:  Oriented to: Person, Place, Time and Situation  Follows Commands/attention: Follows multistep  commands  Communication: clear/fluent  Memory:  No Deficits noted  Safety awareness/insight to disability: intact  Coping skills/emotional control: Appropriate to situation    Visual/perceptual:  Intact    Physical Exam:  Postural examination/scapula alignment: No postural abnormalities identified  Skin integrity: SX SITE BANDAGE  Edema: Mild L SHOULDER AREA      Sensation:   Intact    Upper Extremity Range of Motion:  Right  Upper Extremity: WFL  Left Upper Extremity: NOT TESTED    Upper Extremity Strength:  Right Upper Extremity: WFL  Left Upper Extremity: NOT TESTED   Strength: R HAND WFL AND L HAND NOT TESTED    Fine motor coordination:   Intact    Gross motor coordination: LIMITED DUE TO SX ON L UE    Functional Mobility:  Bed Mobility:  Scooting/Bridging: Independent    Transfers:  Sit <> Stand Assistance: Independent  Sit <> Stand Assistive Device: No Assistive Device  Bed <> Chair Technique: Stand Pivot  Bed <> Chair Transfer Assistance: Independent  Bed <> Chair Assistive Device: No Assistive Device  Toilet Transfer Assistance: Independent (PER PT REPORT)  Toilet Transfer Assistive Device: No Assistive Device    Functional Ambulation: WFL    Activities of Daily Living:     Feeding adaptive equipment: NA  UE Dressing Level of Assistance: Moderate assistance  UE adaptive equipment: NA  LE Dressing Level of Assistance: Independent  LE adaptive equipment: NA  Grooming Position: Standing at sink  Grooming Level of Assistance: Independent  Toileting Where Assessed: Toilet  Toileting Level of Assistance: Independent (PER PT REPORT)        Bathing adaptive equipment: NA    Balance:   Static Sit: NORMAL: No deviations seen in posture held statically  Dynamic Sit: NORMAL: No deviations seen in posture held dynamically  Static Stand: NORMAL: No deviations seen in posture held statically  Dynamic stand: NORMAL: No deviations seen in posture held dynamically    Therapeutic Activities and Exercises:    AM-PAC 6 CLICK ADL  How much help from another person does this patient currently need?  1 = Unable, Total/Dependent Assistance  2 = A lot, Maximum/Moderate Assistance  3 = A little, Minimum/Contact Guard/Supervision  4 = None, Modified [place]/Independent    Putting on and taking off regular lower body clothing? : 4  Bathing (including washing, rinsing, drying)?: 1 (NOT TESTED)  Toileting, which includes using toilet, bedpan, or  "urinal? : 4  Putting on and taking off regular upper body clothing?: 2  Taking care of personal grooming such as brushing teeth?: 4  Eating meals?: 4  Total Score: 19    AM-PAC Raw Score CMS "G-Code Modifier Level of Impairment Assistance   6 % Total / Unable   7 - 9 CM 80 - 100% Maximal Assist   10-14 CL 60 - 80% Moderate Assist   15 - 19 CK 40 - 60% Moderate Assist   20 - 22 CJ 20 - 40% Minimal Assist   23 CI 1-20% SBA / CGA   24 CH 0% Independent/ Mod I       Patient left STANDING AT SINKSIDE PERFORMING HAIR GROOMING TASK with all lines intact, call button in reach and NURSE TRAM notified    Assessment:  Kwesi Garcia is a 66 y.o. male with a medical diagnosis of Acute pain of left shoulder and presents with (I) WITH ADL'S EXCEPT WITH  WITH UE DRESSING AND POSSIBLE BATHING. PT VERBALIZED UNDERSTANDING ON HOW TO MANISH/ DOFF SLING , HOWEVER NEEDED SOME ASSISTANCE. PT WFL ON R UE ROM/STRENGTH/ENDURANCE,AMBULATION AND BED MOBILITY. PT EDUCATED ON L UE ELBOW AND BELOW EXERCISE IN CASE L UE NUMBNESS AND TINGLING. PT DISCHARGED FROM SKILLED O.T.    Rehab identified problem list/impairments: Rehab identified problem list/impairments: impaired self care skills    Rehab potential is good.    Activity tolerance: Good    Discharge recommendations: Discharge Facility/Level Of Care Needs: outpatient OT     Barriers to discharge: Barriers to Discharge: Decreased caregiver support    Equipment recommendations: none     GOALS:    Occupational Therapy Goals     Not on file                PLAN:  Patient to be seen   to address the above listed problems via    Plan of Care expires:    Plan of Care reviewed with: patient         Madeline Leivazier, OT  06/08/2017  "

## 2017-06-08 NOTE — DISCHARGE SUMMARY
[facility] -   Orthopedics  Discharge Summary      Patient Name: Kwesi Garcia  MRN: [id number]  Admission Date: 6/7/2017   Hospital Length of Stay: 1 days  Discharge Date and Time:  06/08/2017 6:42 PM  Attending Physician: Rain att. providers found   Discharging Provider: Chavez Rollins Sr, MD  Primary Care Provider: Primary Doctor Rain    HPI: This is a 66-year-old male that was admitted for a left proximal humerus fracture.  The patient underwent a left shoulder hemiarthroplasty.    Procedure(s) (LRB):  ARTHROPLASTY-SHOULDER-LAKSHMI (Left)      Hospital Course:  Postoperatively the patient was treated with pain medication.  No complications and noted to have inimal pain in his left shoulder just prior to discharge.        Significant Diagnostic Studies: Left shoulder intraoperative x-rays    Pending Diagnostic Studies:     None        Final Active Diagnoses:    Diagnosis Date Noted POA    PRINCIPAL PROBLEM:  Acute pain of left shoulder [M25.512] 04/20/2017 Yes    Contracture, left shoulder [M24.512] 06/07/2017 Yes      Problems Resolved During this Admission:    Diagnosis Date Noted Date Resolved POA      Discharged Condition: stable    Disposition: Home or Self Care    Follow Up:  Follow-up Information     Chavez Rollins Sr, MD.    Specialty:  Orthopedic Surgery  Why:  As needed  Contact information:  [address] [facility] [address] [phone]                 Patient Instructions:     Referral to Occupational therapy   Referral Priority: Routine Referral Type: Occupational Therapy   Referral Reason: Specialty Services Required    Requested Specialty: Occupational Therapy    Number of Visits Requested: 1      Diet general     Call MD for:  temperature >100.4     Call MD for:  persistent nausea and vomiting     Call MD for:  severe uncontrolled pain     Call MD for:  difficulty breathing, headache or visual disturbances     Call MD for:  redness, tenderness, or signs of infection (pain, swelling,  redness, odor or green/yellow discharge around incision site)     Call MD for:  hives     Call MD for:  persistent dizziness or light-headedness     Call MD for:  extreme fatigue     Other restrictions (specify):   Order Comments: Avoid all external rotation, avoid abduction and forward flexion greater than 45°.     Remove dressing in 48 hours   Order Comments: Apply Betadine, gauze and Sponge tape daily, after 48 hours.     Discharge to home, resume regular diet, continue the shoulder immobilizer patient.  Follow up 2 weeks following the discharge.  Medications:Percocet  Reconciled Home Medications:   Discharge Medication List as of 6/8/2017 10:13 AM      CONTINUE these medications which have NOT CHANGED    Details   oxycodone-acetaminophen (PERCOCET)  mg per tablet Take 1 tablet by mouth every 4 (four) hours as needed., Starting Wed 6/7/2017, Print             Chavez Rollins Sr, MD  Orthopedics  [facility]

## 2017-06-08 NOTE — NURSING
Verbalized understanding of discharge paperwork, follow up care, prescriptions, etc.  Questions answered.  No acute distress noted.  Patient refused assistance getting dressed.  Encouraged to call when ready to go.

## 2017-06-08 NOTE — PROGRESS NOTES
"SUBJECTIVE:  Patient is status post Left Shoulder hemiarthroplasty for a comminuted humeral head fracture. The patient is alert and oriented ×3. He appears anxious to be discharged to home.\  He is very comfortable and wants to know when he can return to his job as a [employer] .  Patient complains of   5/10 pain that is better with the  pain meds and aggravated with movement.    History reviewed. No pertinent past medical history.  Past Surgical History:   Procedure Laterality Date    ADENOIDECTOMY      APPENDECTOMY      right arm ORIF Right     TONSILLECTOMY       Review of patient's allergies indicates:   Allergen Reactions    Ampicillin Rash     No current facility-administered medications on file prior to encounter.      No current outpatient prescriptions on file prior to encounter.     /69 (BP Location: Right arm, Patient Position: Sitting, BP Method: Automatic)   Pulse 90   Temp 98.3 °F (36.8 °C) (Axillary)   Resp 16   Ht 5' 9" (1.753 m)   Wt 69.6 kg (153 lb 7 oz)   SpO2 96%   BMI 22.66 kg/m²    ROS:  GENERAL: No fever, chills, fatigability or weight loss.  SKIN: No rashes, itching or changes in color or texture of skin.  HEAD: No headaches or recent head trauma.  EYES: Visual acuity fine. No photophobia, ocular pain or diplopia.  EARS: Denies ear pain, discharge or vertigo.  NOSE: No loss of smell, no epistaxis or postnasal drip.  MOUTH & THROAT: No hoarseness or change in voice. No excessive gum bleeding.  NODES: Denies swollen glands.  CHEST: Denies PARSONS, cyanosis, wheezing, cough and sputum production.  CARDIOVASCULAR: Denies chest pain, PND, orthopnea or reduced exercise tolerance.  ABDOMEN: Appetite fine. No weight loss. Denies diarrhea, abdominal pain, hematemesis or blood in stool.  URINARY: No flank pain, dysuria or hematuria.  PERIPHERAL VASCULAR: No claudication or cyanosis.  NEUROLOGIC: No history of seizures, paralysis, alteration of gait or coordination.  MUSCULOSKELETAL: " See HPI    PE:  APPEARANCE: Well nourished, well developed, in no acute distress.   HEAD: Normocephalic, atraumatic.  EYES: PERRL. EOMI.   EARS: TM's intact. Light reflex normal. No retraction or perforation.   NOSE: Mucosa pink. Airway clear.  MOUTH & THROAT: No tonsillar enlargement. No pharyngeal erythema or exudate. No stridor.  NECK: Supple.   NODES: No cervical, axillary or inguinal lymph node enlargement.  CHEST: Lungs clear to auscultation.  CARDIOVASCULAR: Normal S1, S2. No rubs, murmurs or gallops.  ABDOMEN: Bowel sounds normal. Not distended. Soft. No tenderness or masses.  NEUROLOGIC: Cranial Nerves: II-XII grossly intact, also see MUSCULOSKELETAL  MUSCULOSKELETAL:       Left Shoulder     Dressing intact, 2 plus radial  artery and ulnar artery pulses, light touch intact Left upper extremity.  All digits are warm. No erythema, no warmth, no drainage, mild swelling, mild tenderness.      ASSESSMENT:  The patient is stable and improving.      PLAN:  Discharge to home  Follow up in 2 weeks  Continue pain medication  Continue occupational therapy

## 2017-06-09 DIAGNOSIS — Z98.890 POST-OPERATIVE STATE: Primary | ICD-10-CM

## 2017-06-09 NOTE — PT/OT/SLP EVAL
Physical Therapy  Evaluation    Kwesi Garcia   MRN: [id number]   Admitting Diagnosis: Acute pain of left shoulder    PT Received On: 06/08/17  PT Start Time: 0942     PT Stop Time: 1012    PT Total Time (min): 30 min       Billable Minutes:  Evaluation 15 and Gait [id number]    Diagnosis: Acute pain of left shoulder      History reviewed. No pertinent past medical history.   Past Surgical History:   Procedure Laterality Date    ADENOIDECTOMY      APPENDECTOMY      right arm ORIF Right     TONSILLECTOMY         Referring physician: LAZARUS  Date referred to PT: 6/8/2017      General Precautions: Standard, fall  Orthopedic Precautions: LUE non weight bearing   Braces: Shoulder abduction brace            Patient History:  Lives With: alone  Living Arrangements: apartment  Home Layout: Able to live on 1st floor  Stair Railings at Home: none  Transportation Available: family or friend will provide  Living Environment Comment: PT WAS IND AT HOME AND WORKS AT [employer]  Equipment Currently Used at Home: none  DME owned (not currently used): none    Previous Level of Function:  Ambulation Skills: independent  Transfer Skills: independent  ADL Skills: independent  Work/Leisure Activity: independent    Subjective:  Communicated with NURSE AND EPIC CHART REVIEW prior to session.   PT AGREED TO EVAL ANDT X   Chief Complaint: PAIN   Patient goals: GO HOME    Pain/Comfort  Pain Rating 1: 3/10  Location - Side 1: Left  Location 1: shoulder      Objective:   Patient found with: peripheral IV     Cognitive Exam:  Oriented to: Person, Place, Time and Situation    Follows Commands/attention: Follows multistep  commands  Communication: clear/fluent  Safety awareness/insight to disability: intact    Physical Exam:  Postural examination/scapula alignment: No postural abnormalities identified    Skin integrity: Visible skin intact  Edema: Mild L UE      Sensation:   Intact    Lower Extremity Range of Motion:  Right Lower Extremity:  WNL  Left Lower Extremity: WNL    Lower Extremity Strength:  Right Lower Extremity: WNL  Left Lower Extremity: WNL      Functional Mobility:  Bed Mobility:  Scooting/Bridging: Independent  Supine to Sit: Independent    Transfers:  Sit <> Stand Assistance: Independent  Sit <> Stand Assistive Device: No Assistive Device  Bed <> Chair Technique: Stand Pivot  Bed <> Chair Assistance: Independent  Bed <> Chair Assistive Device: No Assistive Device    Gait:   Gait Distance: PT IND WITH GT SHORT COMMUNITY DISTANCES WITH NO LOB  Assistance 1: Independent  Gait Assistive Device: No device      Therapeutic Activities and Exercises:  PT IND WITH GROSS FUNC MOBILITY AND WILL BE D/C FROM P.T. AND UE TA AND TE DEFERED TO O.T.    AM-PAC 6 CLICK MOBILITY  How much help from another person does this patient currently need?   1 = Unable, Total/Dependent Assistance  2 = A lot, Maximum/Moderate Assistance  3 = A little, Minimum/Contact Guard/Supervision  4 = None, Modified [place]/Independent    Turning over in bed (including adjusting bedclothes, sheets and blankets)?: 4  Sitting down on and standing up from a chair with arms (e.g., wheelchair, bedside commode, etc.): 4  Moving from lying on back to sitting on the side of the bed?: 4  Moving to and from a bed to a chair (including a wheelchair)?: 4  Need to walk in hospital room?: 4  Climbing 3-5 steps with a railing?: 1 (NT)  Total Score: 21     AM-PAC Raw Score CMS G-Code Modifier Level of Impairment Assistance   6 % Total / Unable   7 - 9 CM 80 - 100% Maximal Assist   10 - 14 CL 60 - 80% Moderate Assist   15 - 19 CK 40 - 60% Moderate Assist   20 - 22 CJ 20 - 40% Minimal Assist   23 CI 1-20% SBA / CGA   24 CH 0% Independent/ Mod I     Patient left up in chair with call button in reach.    Assessment:   Kwesi Garcia is a 66 y.o. male with a medical diagnosis of Acute pain of left shoulder and presents with L SHOULDER PAIN AND LIMITED ROM S/P SX. PT WILL BE D/C FROM  P.T. AT THIS TIME. NO ACUTE P.T. REC AT THIS TIME. .    Rehab identified problem list/impairments: Rehab identified problem list/impairments: pain    Discharge recommendations: Discharge Facility/Level Of Care Needs: outpatient OT     Barriers to discharge: Barriers to Discharge: Decreased caregiver support    Equipment recommendations:       GOALS:    Physical Therapy Goals     Not on file                PLAN:    Patient to be seen   to address the above listed problems via    Plan of Care expires:    Plan of Care reviewed with: patient    Functional Assessment Tool Used: [place]  PAC  Score: CH  Functional Limitation: Mobility: Walking and moving around  Mobility: Walking and Moving Around Current Status ():   Mobility: Walking and Moving Around Goal Status ():   Mobility: Walking and Moving Around Discharge Status ():      Pattie Gonzalez, PT  06/08/2017

## 2017-06-11 LAB
BLD PROD TYP BPU: NORMAL
BLOOD UNIT EXPIRATION DATE: NORMAL
BLOOD UNIT TYPE CODE: 5100
BLOOD UNIT TYPE: NORMAL
CODING SYSTEM: NORMAL
DISPENSE STATUS: NORMAL
NUM UNITS TRANS PACKED RBC: NORMAL

## 2017-06-12 ENCOUNTER — PATIENT OUTREACH (OUTPATIENT)
Dept: ADMINISTRATIVE | Facility: CLINIC | Age: 67
End: 2017-06-12
Payer: MEDICARE

## 2017-06-12 NOTE — PATIENT INSTRUCTIONS
Total Shoulder Replacement Surgery  During shoulder replacement surgery, all or part of your problem shoulder is replaced with an artificial joint, called a prosthesis. The prosthesis replaces the rough, worn parts of your shoulder with smooth metal and plastic parts.    Before your surgery  You will most likely arrive at the hospital on the morning of the surgery. Be sure to follow all of your doctors instructions on preparing for surgery.  · You should stop eating or drinking 10 hours before surgery.  · If you take a daily medication, ask if you should still take it the morning of surgery.  · At the hospital, your temperature, pulse, breathing, and blood pressure will be checked.  · An IV (intravenous) line will be started to provide fluids and medications needed during surgery.  The surgical procedure  When the surgical team is ready, youll be taken to the operating room. There youll be given anesthesia to help you sleep through surgery. Your surgeon may replace just the ball (partial replacement) or both the ball and the socket (total replacement). An incision about six inches long is made from your collarbone to your arm. Once the new joint is in place, your surgeon closes the incision with surgical staples or sutures (stitches).  After your surgery  After surgery, youll be sent to the PACU (postanesthesia care unit). When you are fully awake, youll be moved to your room. The nurses will give you medications to ease your pain. Soon, health care providers will help you get up and moving. You may also have physical therapy after surgery.  You may need to wear an arm sling for a few weeks.  When to call your doctor  Once at home, call your doctor if you have any of the symptoms below:  · An increase in pain not relieved by your pain medicine  · Unusual redness, heat, or drainage at the incision site  · Fever over 100.4°F (38°C)   Date Last Reviewed: 9/10/2016  © 7899-8358 [employer] [address]  [place] [address]. All rights reserved. This information is not intended as a substitute for professional medical care. Always follow your healthcare professional's instructions.

## 2017-06-12 NOTE — PROGRESS NOTES
Discharge Information     Discharge Date:  6/8/17         Primary Discharge Diagnosis:  Acute pain of left shoulder          Rosenda Stanley, RNC3 nurse attempted to contact patient. No answer. The following message was left for the patient to return the call:  Good morning,  I am a nurse calling on behalf of [facility] from the [facility].  This is a Transitional Care Call for Kwesi Garcia. When you have a moment please contact us at [phone] or [phone] Monday through Friday, between the hours of 8 am to 4 pm. We look forward to speaking with you. On behalf of [facility] have a nice day.    The patient has a scheduled POSTOP appointment with Dr. Rollins on 6/22/17 @ 1500hrs. Message sent to Physician staff.

## 2017-06-13 ENCOUNTER — TELEPHONE (OUTPATIENT)
Dept: ORTHOPEDICS | Facility: CLINIC | Age: 67
End: 2017-06-13

## 2017-06-13 NOTE — TELEPHONE ENCOUNTER
Called pt to inform of 6/23 not being an available date to change his post op appointment to. Pt was unavailable. No voicemail to leave a message.

## 2017-06-16 ENCOUNTER — TELEPHONE (OUTPATIENT)
Dept: ORTHOPEDICS | Facility: CLINIC | Age: 67
End: 2017-06-16

## 2017-06-16 NOTE — TELEPHONE ENCOUNTER
----- Message from Hannah Quiroga sent at 6/16/2017  3:11 PM CDT -----  Contact: pt   ..Pt was returning nurse call    ..[phone] (home)

## 2017-06-21 DIAGNOSIS — M25.512 ACUTE PAIN OF LEFT SHOULDER: Primary | ICD-10-CM

## 2017-06-27 ENCOUNTER — CLINICAL SUPPORT (OUTPATIENT)
Dept: REHABILITATION | Facility: HOSPITAL | Age: 67
End: 2017-06-27
Attending: ORTHOPAEDIC SURGERY
Payer: MEDICARE

## 2017-06-27 DIAGNOSIS — Z96.612 STATUS POST LEFT SHOULDER HEMIARTHROPLASTY: Primary | ICD-10-CM

## 2017-06-27 DIAGNOSIS — S42.292S HUMERAL HEAD FRACTURE, LEFT, SEQUELA: ICD-10-CM

## 2017-06-27 PROCEDURE — G8984 CARRY CURRENT STATUS: HCPCS | Mod: CK

## 2017-06-27 PROCEDURE — 97161 PT EVAL LOW COMPLEX 20 MIN: CPT

## 2017-06-27 PROCEDURE — 97014 ELECTRIC STIMULATION THERAPY: CPT

## 2017-06-27 PROCEDURE — G8985 CARRY GOAL STATUS: HCPCS | Mod: CI

## 2017-06-27 NOTE — PROGRESS NOTES
PHYSICAL THERAPY INITIAL OUTPATIENT EVALUATION    Referring Provider:  Dr. Chavez Rollins Sr.    Diagnosis:       ICD-10-CM ICD-9-CM    1. Status post left shoulder hemiarthroplasty Z96.612 V43.61    2. Humeral head fracture, left, sequela S42.292S 905.2        Orders:  Evaluate and Treat    Date of Initial Evaluation: 6/27/17    Visit # 1    BACKGROUND:  Patient is a 67 y/o male 3 weeks s/p L shoulder hemiarthroplasty following a fall in which he fractured his proximal humerus.  Patient denies having much pain at this time.  He is currently wearing a brace on his L shoulder.  He reports that his current pain level is a 3-4/10. Patient is R hand dominant.    OBJECTIVE:  Posture: Pt noted to present with forward head/rounded shoulder posture.     Shoulder ROM:   Passive Joint Range Right Left   Flexion 180 deg 100 deg    ABDuction 180 deg  100 deg   Internal Rotation 60 deg @ 90 abd 25 deg @ 90 abd   External Rotation 100 deg @ 90 abd 15 deg @ 90 abd     Strength:     Muscle (Myotome) Right Left   Shoulder Flex WNL NT   Shoulder Abduction WNL NT   Elbow Flexors (C5) WNL 3/5   Elbow Extensors (C7) WNL 3/5     Neuro/Sensation/Reflexs: Intact      Function: Patient reports 41% disability based on score of the DASH.    Functional Limitations and Goal:   This patient's primary Physical Therapy goal is to improve his current level of function(carrying,moving and handling objects ) with minimal limitations. The patient's current level of impairment is 40-60% Impaired(CK) based on their score of 41% impaired on the DASH. The Patient is expected to achieve a score of 1-20%(CI) within 10 treatments.      ASSESSMENT:  The patient is a 66 y.o. year old male referred to physical therapy 3 weeks s/p partial L shoulder replacement.  Patient's presents with decreased strength and ROM and impaired functional use of his L arm.  These impairments are limiting patient's ability to perform his ADLs and work activities..  Patient  will benefit from skilled physical therapy intervention to address impairments and return to his PLOF with full ROM and strength in his L shoulder    Short Term Goals:    1.Patient to be I with HEP.  2.Increase AROM L shoulder to 130 deg for flex/abd and 40 deg of IR/ER @ 90 abd.  3. Increase L UE strength to 3/5 within available ROM.  4. Pt to have pain less than 3/10 at all times.  5 .Pt  To score less than 20% impaired on the DASH.    Long Term Goals:  1. Pt to perform daily activities including ADLs and work activities without limitation.  2. Pt to have strength and ROM in L shoulder WNL.  3. Pt to     TREATMENT PROVIDED:  -Evaluation: 20 min  -Modalities - TENS to L shoulder - 15 min  -Education: Patient education on PROM of L shoulder and active movements of L wrist and elbow    PLAN:  Patient will benefit from physical therapy (2-3) x/week for (4-6) weeks including manual therapy, therapeutic exercise, functional activities, modalities, and patient education.    Thank you for this referral.    These services are reasonable and necessary for the conditions set forth above while under my care.

## 2017-06-29 ENCOUNTER — TELEPHONE (OUTPATIENT)
Dept: ORTHOPEDICS | Facility: CLINIC | Age: 67
End: 2017-06-29

## 2017-06-29 NOTE — TELEPHONE ENCOUNTER
Spoke with patient in regards to appt rescheduling due to provider being out of office in emergency surgery. Pt has  been rescheduled 6/30/17 and will call us back if the time doesn't work because he catching rides. Pt verbalized understanding. -  AS

## 2017-06-30 ENCOUNTER — HOSPITAL ENCOUNTER (OUTPATIENT)
Dept: RADIOLOGY | Facility: HOSPITAL | Age: 67
Discharge: HOME | End: 2017-06-30
Attending: ORTHOPAEDIC SURGERY
Payer: MEDICARE

## 2017-06-30 ENCOUNTER — OFFICE VISIT (OUTPATIENT)
Dept: ORTHOPEDICS | Facility: CLINIC | Age: 67
End: 2017-06-30
Payer: MEDICARE

## 2017-06-30 DIAGNOSIS — M25.512 ACUTE PAIN OF LEFT SHOULDER: ICD-10-CM

## 2017-06-30 DIAGNOSIS — Z98.890 POST-OPERATIVE STATE: Primary | ICD-10-CM

## 2017-06-30 PROCEDURE — 99024 POSTOP FOLLOW-UP VISIT: CPT | Mod: ,,, | Performed by: PHYSICIAN ASSISTANT

## 2017-06-30 PROCEDURE — 99999 PR PBB SHADOW E&M-EST. PATIENT-LVL II: CPT | Mod: PBBFAC,,, | Performed by: PHYSICIAN ASSISTANT

## 2017-06-30 PROCEDURE — 99212 OFFICE O/P EST SF 10 MIN: CPT | Mod: PBBFAC,25,PO | Performed by: PHYSICIAN ASSISTANT

## 2017-06-30 PROCEDURE — 73030 X-RAY EXAM OF SHOULDER: CPT | Mod: 26,LT,, | Performed by: RADIOLOGY

## 2017-07-03 NOTE — PROGRESS NOTES
CC:67 y/o male left shoulder pain    Date of Surgery: 6/7/2017 -left shoulder contracture and humeral head fracture/deformity      Procedure: left shoulder hemiarthroplasty and capsulotomy/contracture release    HPI: Under went the above surgery, feeling better. Of note patient has not changed dressing. Pain 2/10    PMH:  History reviewed. No pertinent past medical history.    PSH:    Past Surgical History:   Procedure Laterality Date    ADENOIDECTOMY      APPENDECTOMY      right arm ORIF Right     TONSILLECTOMY         Family Hx:  History reviewed. No pertinent family history.    Allergy:    Review of patient's allergies indicates:   Allergen Reactions    Ampicillin Rash       Medication:    Current Outpatient Prescriptions:     oxycodone-acetaminophen (PERCOCET)  mg per tablet, Take 1 tablet by mouth every 4 (four) hours as needed., Disp: 90 tablet, Rfl: 0    Social History:    Social History     Social History    Marital status:      Spouse name: N/A    Number of children: N/A    Years of education: N/A     Occupational History    Not on file.     Social History Main Topics    Smoking status: Current Every Day Smoker     Packs/day: 1.00     Years: 45.00     Types: Cigarettes    Smokeless tobacco: Never Used    Alcohol use 1.8 oz/week     3 Standard drinks or equivalent per week      Comment: 3 times a week    Drug use: No    Sexual activity: Not on file     Other Topics Concern    Not on file     Social History Narrative    No narrative on file       Vitals:   There were no vitals taken for this visit.     ROS:  GENERAL: No fever, chills, fatigability or weight loss.  SKIN: No rashes, itching or changes in color or texture of skin.  HEAD: No headaches or recent head trauma.  EYES: Visual acuity fine. No photophobia, ocular pain or diplopia.  EARS: Denies ear pain, discharge or vertigo.  NOSE: No loss of smell, no epistaxis or postnasal drip.  MOUTH & THROAT: No hoarseness or change  in voice. No excessive gum bleeding.  NODES: Denies swollen glands.  CHEST: Denies PARSONS, cyanosis, wheezing, cough and sputum production.  CARDIOVASCULAR: Denies chest pain, PND, orthopnea or reduced exercise tolerance.  ABDOMEN: Appetite fine. No weight loss. Denies diarrhea, abdominal pain, hematemesis or blood in stool.  URINARY: No flank pain, dysuria or hematuria.  PERIPHERAL VASCULAR: No claudication or cyanosis.  NEUROLOGIC: No history of seizures, paralysis, alteration of gait or coordination.  MUSCULOSKELETAL: See HPI    PE:  APPEARANCE: Well nourished, well developed, in no acute distress.   HEAD: Normocephalic, atraumatic.  NEUROLOGIC: Cranial Nerves: II-XII grossly intact, also see MUSCULOSKELETAL  MUSCULOSKELETAL: Left shoulder   Dressing intact, 2 plus distal pulses, light touch intact Left  upper extremity.  All digits are warm. No erythema, no warmth, no drainage, No swelling, no significant tenderness.  No signs of infection.    Assessment:           Diagnosis:              1.S/P left shoulder contracture and humeral head fracture/deformity                           Plan:                                                 1. PT-yes                                                 2.OT-yes                                          3.NSAID-no                                        4. Narcotics-no                                     5. Wound care-Yes                                 6. Rest-yes                                           7. Surgery-no                                         8. EDWIN Hose-no                                    9. Anticoagulation therapy-no               10. Elevation-no                                     11. Crutches-no                                    12. Walker-no             13. Cane no                        14. Referral-no                                     15.Injection-no                            16. Splint   /    Cast   /   Cast Shoe-No              17. RICE             18. Follow up-  Sutures out today, advised to clean daily with betadine. Follow up in 4 weeks.

## 2017-07-05 ENCOUNTER — CLINICAL SUPPORT (OUTPATIENT)
Dept: REHABILITATION | Facility: HOSPITAL | Age: 67
End: 2017-07-05
Attending: ORTHOPAEDIC SURGERY
Payer: MEDICARE

## 2017-07-05 DIAGNOSIS — Z96.612 STATUS POST LEFT SHOULDER HEMIARTHROPLASTY: Primary | ICD-10-CM

## 2017-07-05 DIAGNOSIS — S42.292S HUMERAL HEAD FRACTURE, LEFT, SEQUELA: ICD-10-CM

## 2017-07-05 PROCEDURE — 97014 ELECTRIC STIMULATION THERAPY: CPT | Performed by: PHYSICAL THERAPIST

## 2017-07-05 PROCEDURE — 97110 THERAPEUTIC EXERCISES: CPT | Performed by: PHYSICAL THERAPIST

## 2017-07-05 PROCEDURE — 97140 MANUAL THERAPY 1/> REGIONS: CPT | Performed by: PHYSICAL THERAPIST

## 2017-07-05 NOTE — PROGRESS NOTES
PHYSICAL THERAPY INITIAL OUTPATIENT EVALUATION    Referring Provider:  Dr. Chavez Rollins Sr.    Diagnosis:       ICD-10-CM ICD-9-CM    1. Status post left shoulder hemiarthroplasty Z96.612 V43.61    2. Humeral head fracture, left, sequela S42.292S 905.2        Orders:  Evaluate and Treat    Date of Initial Evaluation: 6/27/17    Visit # 3    SUBJECTIVE: no new complaints    BACKGROUND:  Patient is a 67 y/o male 3 weeks s/p L shoulder hemiarthroplasty following a fall in which he fractured his proximal humerus.  Patient denies having much pain at this time.  He is currently wearing a brace on his L shoulder.  He reports that his current pain level is a 3-4/10. Patient is R hand dominant.    OBJECTIVE:  Posture: Pt noted to present with forward head/rounded shoulder posture.     Shoulder ROM:   Passive Joint Range Right Left   Flexion 180 deg 100 deg    ABDuction 180 deg  100 deg   Internal Rotation 60 deg @ 90 abd 25 deg @ 90 abd   External Rotation 100 deg @ 90 abd 15 deg @ 90 abd     Strength:     Muscle (Myotome) Right Left   Shoulder Flex WNL NT   Shoulder Abduction WNL NT   Elbow Flexors (C5) WNL 3/5   Elbow Extensors (C7) WNL 3/5     Neuro/Sensation/Reflexs: Intact      Function: Patient reports 41% disability based on score of the DASH.    Functional Limitations and Goal:   This patient's primary Physical Therapy goal is to improve his current level of function(carrying,moving and handling objects ) with minimal limitations. The patient's current level of impairment is 40-60% Impaired(CK) based on their score of 41% impaired on the DASH. The Patient is expected to achieve a score of 1-20%(CI) within 10 treatments.      ASSESSMENT:  Pt tolerated tx well today    Short Term Goals:    1.Patient to be I with HEP.  2.Increase AROM L shoulder to 130 deg for flex/abd and 40 deg of IR/ER @ 90 abd.  3. Increase L UE strength to 3/5 within available ROM.  4. Pt to have pain less than 3/10 at all times.  5 .Pt  To  score less than 20% impaired on the DASH.    Long Term Goals:  1. Pt to perform daily activities including ADLs and work activities without limitation.  2. Pt to have strength and ROM in L shoulder WNL.  3. Pt to     TREATMENT PROVIDED:  -Manual: STM to L shoulder 8 min  -Therex: 25 min    -wrist flex/ ext and supination, pronation 30x 2 pound   -putty 5 min   -pulley flexion and scaption   -bicep curls 3x10 2 pounds  -Modalities - TENS and ice to L shoulder - 15 min  -Education: Patient education on PROM of L shoulder and active movements of L wrist and elbow    PLAN:  Patient will benefit from physical therapy (2-3) x/week for (4-6) weeks including manual therapy, therapeutic exercise, functional activities, modalities, and patient education.    Thank you for this referral.    These services are reasonable and necessary for the conditions set forth above while under my care.

## 2017-07-05 NOTE — PATIENT INSTRUCTIONS
Shoulder Arthroscopy  The shoulder is your bodys most flexible joint. It lets the arm move in almost any direction. But this flexibility has a price -- it makes the joint prone to injury. If you have a shoulder problem, a surgical procedure called arthroscopy can help.     A camera in the arthroscope allows your surgeon to view your shoulder joint on a monitor.   Your orthopaedic evaluation  Your doctor will ask about your symptoms and the history of your shoulder problem. He or she will examine your shoulder and may give you tests, such as an X-ray or MRI. These help your doctor find the cause of your shoulder problem.  Arthroscopy: Looking inside your joint  Arthroscopy is a procedure that allows your doctor to see and work inside your shoulder joint. Your doctor makes small incision in your shoulder and inserts a long, thin, lighted instrument, called an arthroscope. During surgery, the arthroscope sends live video images from inside your joint to a screen that your doctor views. Using these images, your doctor can diagnose and treat your shoulder problem. Because arthroscopy uses much smaller incisions than open surgery, recovery is often shorter and less painful.  Risks and possible complications of shoulder arthroscopy  · Stiffness or ongoing pain in your shoulder  · Bleeding or blood clots  · Infection  · Damage to nerves or blood vessels  You may still need open surgery after having arthroscopy.  Date Last Reviewed: 9/10/2015  © 6348-4866 [employer] [address] [place] [address], [place] [address]. All rights reserved. This information is not intended as a substitute for professional medical care. Always follow your healthcare professional's instructions.

## 2017-07-10 ENCOUNTER — CLINICAL SUPPORT (OUTPATIENT)
Dept: REHABILITATION | Facility: HOSPITAL | Age: 67
End: 2017-07-10
Attending: ORTHOPAEDIC SURGERY
Payer: MEDICARE

## 2017-07-10 DIAGNOSIS — S42.292S HUMERAL HEAD FRACTURE, LEFT, SEQUELA: ICD-10-CM

## 2017-07-10 DIAGNOSIS — Z96.612 STATUS POST LEFT SHOULDER HEMIARTHROPLASTY: Primary | ICD-10-CM

## 2017-07-10 PROCEDURE — 97110 THERAPEUTIC EXERCISES: CPT

## 2017-07-10 PROCEDURE — 97014 ELECTRIC STIMULATION THERAPY: CPT

## 2017-07-10 NOTE — PROGRESS NOTES
PHYSICAL THERAPY INITIAL OUTPATIENT EVALUATION    Referring Provider:  Dr. Chavez Rollins Sr.    Diagnosis:       ICD-10-CM ICD-9-CM    1. Status post left shoulder hemiarthroplasty Z96.612 V43.61    2. Humeral head fracture, left, sequela S42.292S 905.2        Orders:  Evaluate and Treat    Date of Initial Evaluation: 6/27/17    Visit # 4    SUBJECTIVE:     BACKGROUND:  Patient is a 65 y/o male 3 weeks s/p L shoulder hemiarthroplasty following a fall in which he fractured his proximal humerus.  Patient denies having much pain at this time.  He is currently wearing a brace on his L shoulder.  He reports that his current pain level is a 3-4/10. Patient is R hand dominant.    OBJECTIVE:    TREATMENT PROVIDED:  -Therex: 25 min    -wrist flex/ ext and supination, pronation 30x 2 pound   -putty 5 min   -pulley flexion and scaption   -bicep curls 3x10 2 pounds   -UBE - 3/3 fw/bw  -Modalities - TENS and MH to L shoulder - 15 min  -Education: Patient education on PROM of L shoulder and active movements of L wrist and elbow     ASSESSMENT:  Patient tolerated treatment well.  Patient demonstrating increased strength and ROM with PT.  Continued PT needed to progress.    PLAN:  Patient will benefit from physical therapy (2-3) x/week for (4-6) weeks including manual therapy, therapeutic exercise, functional activities, modalities, and patient education.

## 2017-07-13 ENCOUNTER — CLINICAL SUPPORT (OUTPATIENT)
Dept: REHABILITATION | Facility: HOSPITAL | Age: 67
End: 2017-07-13
Attending: ORTHOPAEDIC SURGERY
Payer: MEDICARE

## 2017-07-13 DIAGNOSIS — Z96.612 STATUS POST LEFT SHOULDER HEMIARTHROPLASTY: Primary | ICD-10-CM

## 2017-07-13 DIAGNOSIS — S42.292S HUMERAL HEAD FRACTURE, LEFT, SEQUELA: ICD-10-CM

## 2017-07-13 PROCEDURE — 97110 THERAPEUTIC EXERCISES: CPT

## 2017-07-13 PROCEDURE — 97014 ELECTRIC STIMULATION THERAPY: CPT

## 2017-07-13 NOTE — PROGRESS NOTES
PHYSICAL THERAPY INITIAL OUTPATIENT EVALUATION    Referring Provider:  Dr. Chavez Rollins Sr.    Diagnosis:       ICD-10-CM ICD-9-CM    1. Status post left shoulder hemiarthroplasty Z96.612 V43.61    2. Humeral head fracture, left, sequela S42.292S 905.2        Orders:  Evaluate and Treat    Date of Initial Evaluation: 6/27/17    Visit # 5    SUBJECTIVE 7/13 - Patient reports that he was a little more sore after the last treatment but not too bad.    BACKGROUND:  Patient is a 65 y/o male 3 weeks s/p L shoulder hemiarthroplasty following a fall in which he fractured his proximal humerus.  Patient denies having much pain at this time.  He is currently wearing a brace on his L shoulder.  He reports that his current pain level is a 3-4/10. Patient is R hand dominant.    OBJECTIVE:    TREATMENT PROVIDED:  -Therex: 30 min    -wrist flex/ ext and supination, pronation 30x 2 pound   -putty 5 min   -pulley flexion and scaption   -bicep curls 3x10 2 pounds   -UBE - 3/3 fw/bw   -Shldr isometrics - 10 x  Flex/abd/ext  -Modalities - TENS and MH to L shoulder - 15 min    ASSESSMENT:  Patient with increased soreness today but able to complete therex.  Continued PT needed to improve strength and ROM and progress shoulder functioning.     PLAN:  Patient will benefit from physical therapy (2-3) x/week for (4-6) weeks including manual therapy, therapeutic exercise, functional activities, modalities, and patient education.

## 2017-07-19 ENCOUNTER — CLINICAL SUPPORT (OUTPATIENT)
Dept: REHABILITATION | Facility: HOSPITAL | Age: 67
End: 2017-07-19
Attending: ORTHOPAEDIC SURGERY
Payer: MEDICARE

## 2017-07-19 DIAGNOSIS — Z96.612 STATUS POST LEFT SHOULDER HEMIARTHROPLASTY: Primary | ICD-10-CM

## 2017-07-19 DIAGNOSIS — S42.292S HUMERAL HEAD FRACTURE, LEFT, SEQUELA: ICD-10-CM

## 2017-07-19 PROCEDURE — 97110 THERAPEUTIC EXERCISES: CPT

## 2017-07-19 PROCEDURE — 97014 ELECTRIC STIMULATION THERAPY: CPT

## 2017-07-19 NOTE — PROGRESS NOTES
PHYSICAL THERAPY INITIAL OUTPATIENT EVALUATION    Referring Provider:  Dr. Chavez Rollins Sr.    Diagnosis:       ICD-10-CM ICD-9-CM    1. Status post left shoulder hemiarthroplasty Z96.612 V43.61    2. Humeral head fracture, left, sequela S42.292S 905.2        Orders:  Evaluate and Treat    Date of Initial Evaluation: 6/27/17    Visit # 6    SUBJECTIVE 7/19 - Patient reports that he's still a little sore.    BACKGROUND:  Patient is a 67 y/o male 3 weeks s/p L shoulder hemiarthroplasty following a fall in which he fractured his proximal humerus.  Patient denies having much pain at this time.  He is currently wearing a brace on his L shoulder.  He reports that his current pain level is a 3-4/10. Patient is R hand dominant.    OBJECTIVE:    TREATMENT PROVIDED:  -Therex: 30 min    -wrist flex/ ext and supination, pronation 30x 2 pound   -putty 5 min   -pulley flexion and scaption   -bicep curls 3x10 2 pounds   -UBE - 3/3 fw/bw   -Shldr isometrics - 10 x  Flex/abd/ext  -Modalities - TENS and MH to L shoulder - 15 min    ASSESSMENT:  Patient tolerated treatment well.  Patient still limited in active movements, but PROM is improving.  Continued PT needed to progress ROM.    PLAN:  Patient will benefit from physical therapy (2-3) x/week for (4-6) weeks including manual therapy, therapeutic exercise, functional activities, modalities, and patient education.

## 2017-09-18 DIAGNOSIS — M25.512 ACUTE PAIN OF LEFT SHOULDER: Primary | ICD-10-CM

## 2017-09-22 ENCOUNTER — HOSPITAL ENCOUNTER (OUTPATIENT)
Dept: RADIOLOGY | Facility: HOSPITAL | Age: 67
Discharge: HOME | End: 2017-09-22
Attending: ORTHOPAEDIC SURGERY
Payer: MEDICARE

## 2017-09-22 ENCOUNTER — OFFICE VISIT (OUTPATIENT)
Dept: ORTHOPEDICS | Facility: CLINIC | Age: 67
End: 2017-09-22
Payer: MEDICARE

## 2017-09-22 VITALS
SYSTOLIC BLOOD PRESSURE: 135 MMHG | BODY MASS INDEX: 22.2 KG/M2 | HEART RATE: 66 BPM | WEIGHT: 150.38 LBS | DIASTOLIC BLOOD PRESSURE: 78 MMHG

## 2017-09-22 DIAGNOSIS — M24.512 SHOULDER JOINT CONTRACTURE, LEFT: Primary | ICD-10-CM

## 2017-09-22 DIAGNOSIS — M25.512 ACUTE PAIN OF LEFT SHOULDER: ICD-10-CM

## 2017-09-22 PROCEDURE — 99213 OFFICE O/P EST LOW 20 MIN: CPT | Mod: S$PBB,,, | Performed by: ORTHOPAEDIC SURGERY

## 2017-09-22 PROCEDURE — 73030 X-RAY EXAM OF SHOULDER: CPT | Mod: TC,PO,LT

## 2017-09-22 PROCEDURE — 1126F AMNT PAIN NOTED NONE PRSNT: CPT | Mod: ,,, | Performed by: ORTHOPAEDIC SURGERY

## 2017-09-22 PROCEDURE — 99213 OFFICE O/P EST LOW 20 MIN: CPT | Mod: PBBFAC,25,PO | Performed by: ORTHOPAEDIC SURGERY

## 2017-09-22 PROCEDURE — 99999 PR PBB SHADOW E&M-EST. PATIENT-LVL III: CPT | Mod: PBBFAC,,, | Performed by: ORTHOPAEDIC SURGERY

## 2017-09-22 PROCEDURE — 73030 X-RAY EXAM OF SHOULDER: CPT | Mod: 26,LT,, | Performed by: RADIOLOGY

## 2017-09-22 PROCEDURE — 1159F MED LIST DOCD IN RCRD: CPT | Mod: ,,, | Performed by: ORTHOPAEDIC SURGERY

## 2017-09-22 NOTE — PROGRESS NOTES
CC:65 y/o male left shoulder pain    Date of Surgery: 6/7/2017 -left shoulder contracture and humeral head fracture/deformity      Procedure: left shoulder hemiarthroplasty and capsulotomy/contracture release    HPI: Under went the above surgery, feeling better. Of note patient has not changed dressing. Pain 0/10.  The patient is very satisfied with the surgery results.    PMH:  History reviewed. No pertinent past medical history.    PSH:    Past Surgical History:   Procedure Laterality Date    ADENOIDECTOMY      APPENDECTOMY      right arm ORIF Right     TONSILLECTOMY         Family Hx:  History reviewed. No pertinent family history.    Allergy:    Review of patient's allergies indicates:   Allergen Reactions    Ampicillin Rash       Medication:  No current outpatient prescriptions on file.    Social History:    Social History     Social History    Marital status:      Spouse name: N/A    Number of children: N/A    Years of education: N/A     Occupational History    Not on file.     Social History Main Topics    Smoking status: Current Every Day Smoker     Packs/day: 1.00     Years: 45.00     Types: Cigarettes    Smokeless tobacco: Never Used    Alcohol use 1.8 oz/week     3 Standard drinks or equivalent per week      Comment: 3 times a week    Drug use: No    Sexual activity: Not on file     Other Topics Concern    Not on file     Social History Narrative    No narrative on file       Vitals:   /78   Pulse 66   Wt 68.2 kg (150 lb 5.7 oz)   BMI 22.20 kg/m²      ROS:  GENERAL: No fever, chills, fatigability or weight loss.  SKIN: No rashes, itching or changes in color or texture of skin.  HEAD: No headaches or recent head trauma.  EYES: Visual acuity fine. No photophobia, ocular pain or diplopia.  EARS: Denies ear pain, discharge or vertigo.  NOSE: No loss of smell, no epistaxis or postnasal drip.  MOUTH & THROAT: No hoarseness or change in voice. No excessive gum bleeding.  NODES:  Denies swollen glands.  CHEST: Denies PARSONS, cyanosis, wheezing, cough and sputum production.  CARDIOVASCULAR: Denies chest pain, PND, orthopnea or reduced exercise tolerance.  ABDOMEN: Appetite fine. No weight loss. Denies diarrhea, abdominal pain, hematemesis or blood in stool.  URINARY: No flank pain, dysuria or hematuria.  PERIPHERAL VASCULAR: No claudication or cyanosis.  NEUROLOGIC: No history of seizures, paralysis, alteration of gait or coordination.  MUSCULOSKELETAL: See HPI    PE:  APPEARANCE: Well nourished, well developed, in no acute distress.   HEAD: Normocephalic, atraumatic.  NEUROLOGIC: Cranial Nerves: II-XII grossly intact, also see MUSCULOSKELETAL  MUSCULOSKELETAL:     Left shoulder    2 plus distal pulses, light touch intact Left  upper extremity.  All digits are warm. No erythema, no warmth, no drainage, No swelling, no significant tenderness.  No signs of infection. Slight decrease in external rotation    Assessment:           Diagnosis:              1.  left shoulder contracture                           Plan:                                                 1. PT-yes                                                 2.OT-yes                                          3.NSAID-no                                        4. Narcotics-no                                     5. Wound care-None                                 6. Rest-Activities as tolerated                                          7. Surgery-no                                         8. EDWIN Hose-no                                    9. Anticoagulation therapy-no               10. Elevation-no                                     11. Crutches-no                                    12. Walker-no             13. Cane no                        14. Referral-no                                     15.Injection-no                            16. Splint   /    Cast   /   Cast Shoe-No              17. RICE            18. Follow up-as necessary

## 2017-09-22 NOTE — PATIENT INSTRUCTIONS
The Shoulder Joint    The shoulder is made up of bones, muscles, ligaments, and tendons. They work together so you can reach, swing, and lift in comfort. Learning about the parts of the shoulder joint will help you to understand your shoulder problem.  The parts of the joint  The shoulder joint is where the humerus (upper arm bone) meets the scapula (shoulder blade):  · Muscles and ligaments help make up the joint. They attach to the shoulder blade and upper arm bone.  · At the top of the shoulder blade are two bony knobs called the acromion and coracoid process.  · The subacromial space is between the top of the humerus and the acromion. This space is filled with tendons, muscles, and the subacromial bursa.  · The bursa is a sac of fluid that cushions shoulder parts as they move.  The supraspinatus muscle and tendon are located in the subacromial space. They help form the rotator cuff and are commonly injured in a rotator cuff tear.  Date Last Reviewed: 10/12/2015  © 9310-5779 [employer]. [address] [place] [address], [place] [address]. All rights reserved. This information is not intended as a substitute for professional medical care. Always follow your healthcare professional's instructions.

## 2021-03-13 ENCOUNTER — HOSPITAL ENCOUNTER (INPATIENT)
Facility: HOSPITAL | Age: 71
LOS: 1 days | Discharge: HOME | DRG: 065 | End: 2021-03-14
Attending: EMERGENCY MEDICINE | Admitting: EMERGENCY MEDICINE
Payer: MEDICARE

## 2021-03-13 DIAGNOSIS — R20.0 NUMBNESS: Primary | ICD-10-CM

## 2021-03-13 DIAGNOSIS — G45.9 TIA (TRANSIENT ISCHEMIC ATTACK): ICD-10-CM

## 2021-03-13 DIAGNOSIS — I63.9 ACUTE CVA (CEREBROVASCULAR ACCIDENT): ICD-10-CM

## 2021-03-13 DIAGNOSIS — I63.9 STROKE: ICD-10-CM

## 2021-03-13 PROBLEM — E87.1 HYPONATREMIA: Status: ACTIVE | Noted: 2021-03-13

## 2021-03-13 PROBLEM — F10.10 ALCOHOL ABUSE: Status: ACTIVE | Noted: 2021-03-13

## 2021-03-13 PROBLEM — Z72.0 TOBACCO ABUSE: Status: ACTIVE | Noted: 2021-03-13

## 2021-03-13 LAB
ALBUMIN SERPL BCP-MCNC: 4.2 G/DL (ref 3.5–5.2)
ALP SERPL-CCNC: 96 U/L (ref 55–135)
ALT SERPL W/O P-5'-P-CCNC: 22 U/L (ref 10–44)
ANION GAP SERPL CALC-SCNC: 8 MMOL/L (ref 8–16)
AORTIC ROOT ANNULUS: 3.13 CM
ASCENDING AORTA: 3.54 CM
AST SERPL-CCNC: 26 U/L (ref 10–40)
AV INDEX (PROSTH): 0.47
AV MEAN GRADIENT: 10 MMHG
AV PEAK GRADIENT: 12 MMHG
AV VALVE AREA: 1.79 CM2
AV VELOCITY RATIO: 0.44
BASOPHILS # BLD AUTO: 0.06 K/UL (ref 0–0.2)
BASOPHILS NFR BLD: 0.9 % (ref 0–1.9)
BILIRUB SERPL-MCNC: 0.6 MG/DL (ref 0.1–1)
BILIRUB UR QL STRIP: NEGATIVE
BNP SERPL-MCNC: 34 PG/ML (ref 0–99)
BSA FOR ECHO PROCEDURE: 1.97 M2
BUN SERPL-MCNC: 9 MG/DL (ref 8–23)
CALCIUM SERPL-MCNC: 9.3 MG/DL (ref 8.7–10.5)
CHLORIDE SERPL-SCNC: 96 MMOL/L (ref 95–110)
CHOLEST SERPL-MCNC: 169 MG/DL (ref 120–199)
CHOLEST/HDLC SERPL: 2.6 {RATIO} (ref 2–5)
CK SERPL-CCNC: 96 U/L (ref 20–200)
CLARITY UR: CLEAR
CO2 SERPL-SCNC: 28 MMOL/L (ref 23–29)
COLOR UR: YELLOW
CREAT SERPL-MCNC: 0.9 MG/DL (ref 0.5–1.4)
CTP QC/QA: YES
CV ECHO LV RWT: 0.88 CM
DIFFERENTIAL METHOD: ABNORMAL
DOP CALC AO PEAK VEL: 1.72 M/S
DOP CALC AO VTI: 33.99 CM
DOP CALC LVOT AREA: 3.8 CM2
DOP CALC LVOT DIAMETER: 2.2 CM
DOP CALC LVOT PEAK VEL: 0.75 M/S
DOP CALC LVOT STROKE VOLUME: 60.71 CM3
DOP CALC RVOT PEAK VEL: 0.9 M/S
DOP CALC RVOT VTI: 18.41 CM
DOP CALCLVOT PEAK VEL VTI: 15.98 CM
E WAVE DECELERATION TIME: 281.72 MSEC
E/A RATIO: 0.6
E/E' RATIO: 5.62 M/S
ECHO LV POSTERIOR WALL: 1.54 CM (ref 0.6–1.1)
EOSINOPHIL # BLD AUTO: 0.2 K/UL (ref 0–0.5)
EOSINOPHIL NFR BLD: 3.7 % (ref 0–8)
ERYTHROCYTE [DISTWIDTH] IN BLOOD BY AUTOMATED COUNT: 12.4 % (ref 11.5–14.5)
EST. GFR  (AFRICAN AMERICAN): >60 ML/MIN/1.73 M^2
EST. GFR  (NON AFRICAN AMERICAN): >60 ML/MIN/1.73 M^2
ESTIMATED AVG GLUCOSE: 91 MG/DL (ref 68–131)
ETHANOL SERPL-MCNC: <10 MG/DL
FRACTIONAL SHORTENING: 26 % (ref 28–44)
GLUCOSE SERPL-MCNC: 91 MG/DL (ref 70–110)
GLUCOSE UR QL STRIP: NEGATIVE
HBA1C MFR BLD: 4.8 % (ref 4–5.6)
HCT VFR BLD AUTO: 44.3 % (ref 40–54)
HCV AB SERPL QL IA: NEGATIVE
HDLC SERPL-MCNC: 66 MG/DL (ref 40–75)
HDLC SERPL: 39.1 % (ref 20–50)
HGB BLD-MCNC: 15.6 G/DL (ref 14–18)
HGB UR QL STRIP: NEGATIVE
IMM GRANULOCYTES # BLD AUTO: 0.03 K/UL (ref 0–0.04)
IMM GRANULOCYTES NFR BLD AUTO: 0.5 % (ref 0–0.5)
INTERVENTRICULAR SEPTUM: 1.38 CM (ref 0.6–1.1)
KETONES UR QL STRIP: NEGATIVE
LA MAJOR: 3.93 CM
LA MINOR: 2.67 CM
LA WIDTH: 2.69 CM
LDLC SERPL CALC-MCNC: 86.8 MG/DL (ref 63–159)
LEFT ATRIUM SIZE: 2.09 CM
LEFT ATRIUM VOLUME INDEX: 7.8 ML/M2
LEFT ATRIUM VOLUME: 15.2 CM3
LEFT INTERNAL DIMENSION IN SYSTOLE: 2.6 CM (ref 2.1–4)
LEFT VENTRICLE DIASTOLIC VOLUME INDEX: 26.34 ML/M2
LEFT VENTRICLE DIASTOLIC VOLUME: 51.09 ML
LEFT VENTRICLE MASS INDEX: 96 G/M2
LEFT VENTRICLE SYSTOLIC VOLUME INDEX: 12.7 ML/M2
LEFT VENTRICLE SYSTOLIC VOLUME: 24.72 ML
LEFT VENTRICULAR INTERNAL DIMENSION IN DIASTOLE: 3.51 CM (ref 3.5–6)
LEFT VENTRICULAR MASS: 185.81 G
LEUKOCYTE ESTERASE UR QL STRIP: NEGATIVE
LV LATERAL E/E' RATIO: 5.9 M/S
LV SEPTAL E/E' RATIO: 5.36 M/S
LYMPHOCYTES # BLD AUTO: 1.3 K/UL (ref 1–4.8)
LYMPHOCYTES NFR BLD: 20.5 % (ref 18–48)
MCH RBC QN AUTO: 35.1 PG (ref 27–31)
MCHC RBC AUTO-ENTMCNC: 35.2 G/DL (ref 32–36)
MCV RBC AUTO: 100 FL (ref 82–98)
MONOCYTES # BLD AUTO: 0.6 K/UL (ref 0.3–1)
MONOCYTES NFR BLD: 9.6 % (ref 4–15)
MV PEAK A VEL: 0.99 M/S
MV PEAK E VEL: 0.59 M/S
MV STENOSIS PRESSURE HALF TIME: 81.7 MS
MV VALVE AREA P 1/2 METHOD: 2.69 CM2
NEUTROPHILS # BLD AUTO: 4.2 K/UL (ref 1.8–7.7)
NEUTROPHILS NFR BLD: 64.8 % (ref 38–73)
NITRITE UR QL STRIP: NEGATIVE
NONHDLC SERPL-MCNC: 103 MG/DL
NRBC BLD-RTO: 0 /100 WBC
PH UR STRIP: 7 [PH] (ref 5–8)
PISA TR MAX VEL: 2.32 M/S
PLATELET # BLD AUTO: 231 K/UL (ref 150–350)
PMV BLD AUTO: 8.6 FL (ref 9.2–12.9)
POTASSIUM SERPL-SCNC: 4.5 MMOL/L (ref 3.5–5.1)
PROT SERPL-MCNC: 8.3 G/DL (ref 6–8.4)
PROT UR QL STRIP: NEGATIVE
PV MEAN GRADIENT: 2 MMHG
PV PEAK GRADIENT: 3 MMHG
RA MAJOR: 3.36 CM
RA PRESSURE: 3 MMHG
RA WIDTH: 2.55 CM
RBC # BLD AUTO: 4.45 M/UL (ref 4.6–6.2)
SARS-COV-2 RDRP RESP QL NAA+PROBE: NEGATIVE
SINUS: 3.66 CM
SODIUM SERPL-SCNC: 132 MMOL/L (ref 136–145)
SP GR UR STRIP: 1.01 (ref 1–1.03)
STJ: 3.52 CM
TDI LATERAL: 0.1 M/S
TDI SEPTAL: 0.11 M/S
TDI: 0.11 M/S
TR MAX PG: 22 MMHG
TRICUSPID ANNULAR PLANE SYSTOLIC EXCURSION: 2.42 CM
TRIGL SERPL-MCNC: 81 MG/DL (ref 30–150)
TROPONIN I SERPL DL<=0.01 NG/ML-MCNC: 0.01 NG/ML (ref 0–0.03)
TV REST PULMONARY ARTERY PRESSURE: 25 MMHG
URN SPEC COLLECT METH UR: NORMAL
UROBILINOGEN UR STRIP-ACNC: NEGATIVE EU/DL
VIT B12 SERPL-MCNC: 201 PG/ML (ref 210–950)
WBC # BLD AUTO: 6.48 K/UL (ref 3.9–12.7)

## 2021-03-13 PROCEDURE — 93005 ELECTROCARDIOGRAM TRACING: CPT

## 2021-03-13 PROCEDURE — 86803 HEPATITIS C AB TEST: CPT | Performed by: EMERGENCY MEDICINE

## 2021-03-13 PROCEDURE — 25500020 PHARM REV CODE 255: Performed by: EMERGENCY MEDICINE

## 2021-03-13 PROCEDURE — 21400001 HC TELEMETRY ROOM

## 2021-03-13 PROCEDURE — 80053 COMPREHEN METABOLIC PANEL: CPT | Performed by: EMERGENCY MEDICINE

## 2021-03-13 PROCEDURE — U0002 COVID-19 LAB TEST NON-CDC: HCPCS | Performed by: EMERGENCY MEDICINE

## 2021-03-13 PROCEDURE — 82607 VITAMIN B-12: CPT | Performed by: NURSE PRACTITIONER

## 2021-03-13 PROCEDURE — 80061 LIPID PANEL: CPT | Performed by: NURSE PRACTITIONER

## 2021-03-13 PROCEDURE — 85025 COMPLETE CBC W/AUTO DIFF WBC: CPT | Performed by: EMERGENCY MEDICINE

## 2021-03-13 PROCEDURE — 36415 COLL VENOUS BLD VENIPUNCTURE: CPT | Performed by: NURSE PRACTITIONER

## 2021-03-13 PROCEDURE — 25000003 PHARM REV CODE 250: Performed by: NURSE PRACTITIONER

## 2021-03-13 PROCEDURE — 82077 ASSAY SPEC XCP UR&BREATH IA: CPT | Performed by: NURSE PRACTITIONER

## 2021-03-13 PROCEDURE — G0508 CRIT CARE TELEHEA CONSULT 60: HCPCS | Mod: GT,,, | Performed by: PSYCHIATRY & NEUROLOGY

## 2021-03-13 PROCEDURE — 84484 ASSAY OF TROPONIN QUANT: CPT | Performed by: EMERGENCY MEDICINE

## 2021-03-13 PROCEDURE — 99900037 HC PT THERAPY SCREENING (STAT)

## 2021-03-13 PROCEDURE — 99285 EMERGENCY DEPT VISIT HI MDM: CPT | Mod: 25

## 2021-03-13 PROCEDURE — 81003 URINALYSIS AUTO W/O SCOPE: CPT | Performed by: EMERGENCY MEDICINE

## 2021-03-13 PROCEDURE — G0508 PR CRITICAL CARE TELEHLTH INITIAL CONSULT 60MIN: ICD-10-PCS | Mod: GT,,, | Performed by: PSYCHIATRY & NEUROLOGY

## 2021-03-13 PROCEDURE — 93010 ELECTROCARDIOGRAM REPORT: CPT | Mod: ,,, | Performed by: INTERNAL MEDICINE

## 2021-03-13 PROCEDURE — 63600175 PHARM REV CODE 636 W HCPCS: Performed by: NURSE PRACTITIONER

## 2021-03-13 PROCEDURE — 93010 EKG 12-LEAD: ICD-10-PCS | Mod: ,,, | Performed by: INTERNAL MEDICINE

## 2021-03-13 PROCEDURE — 83036 HEMOGLOBIN GLYCOSYLATED A1C: CPT | Performed by: NURSE PRACTITIONER

## 2021-03-13 PROCEDURE — 82962 GLUCOSE BLOOD TEST: CPT

## 2021-03-13 PROCEDURE — 82550 ASSAY OF CK (CPK): CPT | Performed by: EMERGENCY MEDICINE

## 2021-03-13 PROCEDURE — 83880 ASSAY OF NATRIURETIC PEPTIDE: CPT | Performed by: EMERGENCY MEDICINE

## 2021-03-13 RX ORDER — ENOXAPARIN SODIUM 100 MG/ML
40 INJECTION SUBCUTANEOUS EVERY 24 HOURS
Status: DISCONTINUED | OUTPATIENT
Start: 2021-03-13 | End: 2021-03-14 | Stop reason: HOSPADM

## 2021-03-13 RX ORDER — SODIUM CHLORIDE 9 MG/ML
INJECTION, SOLUTION INTRAVENOUS ONCE
Status: COMPLETED | OUTPATIENT
Start: 2021-03-13 | End: 2021-03-13

## 2021-03-13 RX ORDER — CHLORDIAZEPOXIDE HYDROCHLORIDE 10 MG/1
10 CAPSULE, GELATIN COATED ORAL 2 TIMES DAILY
Status: DISCONTINUED | OUTPATIENT
Start: 2021-03-13 | End: 2021-03-14 | Stop reason: HOSPADM

## 2021-03-13 RX ORDER — THIAMINE HCL 100 MG
100 TABLET ORAL DAILY
Status: DISCONTINUED | OUTPATIENT
Start: 2021-03-13 | End: 2021-03-14 | Stop reason: HOSPADM

## 2021-03-13 RX ORDER — FOLIC ACID 1 MG/1
1 TABLET ORAL DAILY
Status: DISCONTINUED | OUTPATIENT
Start: 2021-03-13 | End: 2021-03-14 | Stop reason: HOSPADM

## 2021-03-13 RX ORDER — SODIUM CHLORIDE 9 MG/ML
INJECTION, SOLUTION INTRAVENOUS CONTINUOUS
Status: DISCONTINUED | OUTPATIENT
Start: 2021-03-13 | End: 2021-03-13

## 2021-03-13 RX ORDER — CLOPIDOGREL BISULFATE 75 MG/1
300 TABLET ORAL ONCE
Status: COMPLETED | OUTPATIENT
Start: 2021-03-13 | End: 2021-03-13

## 2021-03-13 RX ORDER — ASPIRIN 81 MG/1
TABLET ORAL
Status: ON HOLD | COMMUNITY
End: 2021-03-14 | Stop reason: HOSPADM

## 2021-03-13 RX ORDER — CLOPIDOGREL BISULFATE 75 MG/1
75 TABLET ORAL DAILY
Status: DISCONTINUED | OUTPATIENT
Start: 2021-03-14 | End: 2021-03-14 | Stop reason: HOSPADM

## 2021-03-13 RX ORDER — NAPROXEN SODIUM 220 MG/1
81 TABLET, FILM COATED ORAL DAILY
Status: DISCONTINUED | OUTPATIENT
Start: 2021-03-13 | End: 2021-03-14 | Stop reason: HOSPADM

## 2021-03-13 RX ORDER — ATORVASTATIN CALCIUM 40 MG/1
40 TABLET, FILM COATED ORAL DAILY
Status: DISCONTINUED | OUTPATIENT
Start: 2021-03-13 | End: 2021-03-14 | Stop reason: HOSPADM

## 2021-03-13 RX ADMIN — ASPIRIN 81 MG: 81 TABLET, CHEWABLE ORAL at 01:03

## 2021-03-13 RX ADMIN — CLOPIDOGREL 300 MG: 75 TABLET, FILM COATED ORAL at 01:03

## 2021-03-13 RX ADMIN — ATORVASTATIN CALCIUM 40 MG: 40 TABLET, FILM COATED ORAL at 01:03

## 2021-03-13 RX ADMIN — IOHEXOL 100 ML: 350 INJECTION, SOLUTION INTRAVENOUS at 02:03

## 2021-03-13 RX ADMIN — ENOXAPARIN SODIUM 40 MG: 40 INJECTION SUBCUTANEOUS at 07:03

## 2021-03-13 RX ADMIN — FOLIC ACID 1 MG: 1 TABLET ORAL at 08:03

## 2021-03-13 RX ADMIN — SODIUM CHLORIDE: 0.9 INJECTION, SOLUTION INTRAVENOUS at 01:03

## 2021-03-13 RX ADMIN — CHLORDIAZEPOXIDE HYDROCHLORIDE 10 MG: 10 CAPSULE ORAL at 08:03

## 2021-03-13 RX ADMIN — SODIUM CHLORIDE: 0.9 INJECTION, SOLUTION INTRAVENOUS at 07:03

## 2021-03-13 RX ADMIN — THIAMINE HCL TAB 100 MG 100 MG: 100 TAB at 08:03

## 2021-03-14 VITALS
OXYGEN SATURATION: 97 % | TEMPERATURE: 98 F | DIASTOLIC BLOOD PRESSURE: 65 MMHG | BODY MASS INDEX: 26.75 KG/M2 | WEIGHT: 170.44 LBS | SYSTOLIC BLOOD PRESSURE: 140 MMHG | RESPIRATION RATE: 16 BRPM | HEIGHT: 67 IN | HEART RATE: 71 BPM

## 2021-03-14 LAB
ALBUMIN SERPL BCP-MCNC: 3.5 G/DL (ref 3.5–5.2)
ALP SERPL-CCNC: 72 U/L (ref 55–135)
ALT SERPL W/O P-5'-P-CCNC: 14 U/L (ref 10–44)
ANION GAP SERPL CALC-SCNC: 8 MMOL/L (ref 8–16)
AST SERPL-CCNC: 20 U/L (ref 10–40)
BASOPHILS # BLD AUTO: 0.06 K/UL (ref 0–0.2)
BASOPHILS NFR BLD: 1.1 % (ref 0–1.9)
BILIRUB SERPL-MCNC: 0.6 MG/DL (ref 0.1–1)
BUN SERPL-MCNC: 9 MG/DL (ref 8–23)
CALCIUM SERPL-MCNC: 8.7 MG/DL (ref 8.7–10.5)
CHLORIDE SERPL-SCNC: 102 MMOL/L (ref 95–110)
CO2 SERPL-SCNC: 23 MMOL/L (ref 23–29)
CREAT SERPL-MCNC: 0.9 MG/DL (ref 0.5–1.4)
DIFFERENTIAL METHOD: ABNORMAL
EOSINOPHIL # BLD AUTO: 0.3 K/UL (ref 0–0.5)
EOSINOPHIL NFR BLD: 5.4 % (ref 0–8)
ERYTHROCYTE [DISTWIDTH] IN BLOOD BY AUTOMATED COUNT: 12.6 % (ref 11.5–14.5)
EST. GFR  (AFRICAN AMERICAN): >60 ML/MIN/1.73 M^2
EST. GFR  (NON AFRICAN AMERICAN): >60 ML/MIN/1.73 M^2
GLUCOSE SERPL-MCNC: 91 MG/DL (ref 70–110)
HCT VFR BLD AUTO: 39.8 % (ref 40–54)
HGB BLD-MCNC: 13.4 G/DL (ref 14–18)
IMM GRANULOCYTES # BLD AUTO: 0.02 K/UL (ref 0–0.04)
IMM GRANULOCYTES NFR BLD AUTO: 0.4 % (ref 0–0.5)
LYMPHOCYTES # BLD AUTO: 1.7 K/UL (ref 1–4.8)
LYMPHOCYTES NFR BLD: 30.4 % (ref 18–48)
MCH RBC QN AUTO: 34.3 PG (ref 27–31)
MCHC RBC AUTO-ENTMCNC: 33.7 G/DL (ref 32–36)
MCV RBC AUTO: 102 FL (ref 82–98)
MONOCYTES # BLD AUTO: 0.5 K/UL (ref 0.3–1)
MONOCYTES NFR BLD: 10 % (ref 4–15)
NEUTROPHILS # BLD AUTO: 2.9 K/UL (ref 1.8–7.7)
NEUTROPHILS NFR BLD: 52.7 % (ref 38–73)
NRBC BLD-RTO: 0 /100 WBC
PLATELET # BLD AUTO: 187 K/UL (ref 150–350)
PMV BLD AUTO: 8.7 FL (ref 9.2–12.9)
POTASSIUM SERPL-SCNC: 4.1 MMOL/L (ref 3.5–5.1)
PROT SERPL-MCNC: 7 G/DL (ref 6–8.4)
RBC # BLD AUTO: 3.91 M/UL (ref 4.6–6.2)
SODIUM SERPL-SCNC: 133 MMOL/L (ref 136–145)
WBC # BLD AUTO: 5.42 K/UL (ref 3.9–12.7)

## 2021-03-14 PROCEDURE — 80053 COMPREHEN METABOLIC PANEL: CPT | Performed by: NURSE PRACTITIONER

## 2021-03-14 PROCEDURE — 97161 PT EVAL LOW COMPLEX 20 MIN: CPT

## 2021-03-14 PROCEDURE — 85025 COMPLETE CBC W/AUTO DIFF WBC: CPT | Performed by: NURSE PRACTITIONER

## 2021-03-14 PROCEDURE — 97165 OT EVAL LOW COMPLEX 30 MIN: CPT

## 2021-03-14 PROCEDURE — 97530 THERAPEUTIC ACTIVITIES: CPT

## 2021-03-14 PROCEDURE — 36415 COLL VENOUS BLD VENIPUNCTURE: CPT | Performed by: NURSE PRACTITIONER

## 2021-03-14 PROCEDURE — 99232 SBSQ HOSP IP/OBS MODERATE 35: CPT | Mod: ,,, | Performed by: PSYCHIATRY & NEUROLOGY

## 2021-03-14 PROCEDURE — 99232 PR SUBSEQUENT HOSPITAL CARE,LEVL II: ICD-10-PCS | Mod: ,,, | Performed by: PSYCHIATRY & NEUROLOGY

## 2021-03-14 PROCEDURE — 63600175 PHARM REV CODE 636 W HCPCS: Performed by: NURSE PRACTITIONER

## 2021-03-14 PROCEDURE — 25000003 PHARM REV CODE 250: Performed by: NURSE PRACTITIONER

## 2021-03-14 RX ORDER — CYANOCOBALAMIN 1000 UG/ML
1000 INJECTION, SOLUTION INTRAMUSCULAR; SUBCUTANEOUS
Status: DISCONTINUED | OUTPATIENT
Start: 2021-03-14 | End: 2021-03-14 | Stop reason: HOSPADM

## 2021-03-14 RX ORDER — ATORVASTATIN CALCIUM 40 MG/1
40 TABLET, FILM COATED ORAL DAILY
Qty: 30 TABLET | Refills: 0 | Status: SHIPPED | OUTPATIENT
Start: 2021-03-15 | End: 2021-04-14 | Stop reason: SDUPTHER

## 2021-03-14 RX ORDER — LANOLIN ALCOHOL/MO/W.PET/CERES
100 CREAM (GRAM) TOPICAL DAILY
Qty: 30 TABLET | Refills: 0 | Status: SHIPPED | OUTPATIENT
Start: 2021-03-15

## 2021-03-14 RX ORDER — CLOPIDOGREL BISULFATE 75 MG/1
75 TABLET ORAL DAILY
Qty: 30 TABLET | Refills: 0 | Status: SHIPPED | OUTPATIENT
Start: 2021-03-15 | End: 2022-03-15

## 2021-03-14 RX ORDER — FOLIC ACID 1 MG/1
1 TABLET ORAL DAILY
Qty: 30 TABLET | Refills: 0 | Status: SHIPPED | OUTPATIENT
Start: 2021-03-15 | End: 2022-03-15

## 2021-03-14 RX ORDER — NAPROXEN SODIUM 220 MG/1
81 TABLET, FILM COATED ORAL DAILY
Qty: 30 TABLET | Refills: 11 | Status: SHIPPED | OUTPATIENT
Start: 2021-03-15 | End: 2022-03-15

## 2021-03-14 RX ADMIN — CYANOCOBALAMIN 1000 MCG: 1000 INJECTION, SOLUTION INTRAMUSCULAR; SUBCUTANEOUS at 10:03

## 2021-03-14 RX ADMIN — FOLIC ACID 1 MG: 1 TABLET ORAL at 09:03

## 2021-03-14 RX ADMIN — THIAMINE HCL TAB 100 MG 100 MG: 100 TAB at 09:03

## 2021-03-14 RX ADMIN — ATORVASTATIN CALCIUM 40 MG: 40 TABLET, FILM COATED ORAL at 09:03

## 2021-03-14 RX ADMIN — CHLORDIAZEPOXIDE HYDROCHLORIDE 10 MG: 10 CAPSULE ORAL at 09:03

## 2021-03-14 RX ADMIN — CLOPIDOGREL 75 MG: 75 TABLET, FILM COATED ORAL at 09:03

## 2021-03-14 RX ADMIN — ASPIRIN 81 MG: 81 TABLET, CHEWABLE ORAL at 09:03

## 2021-03-15 ENCOUNTER — TELEPHONE (OUTPATIENT)
Dept: PULMONOLOGY | Facility: CLINIC | Age: 71
End: 2021-03-15

## 2021-03-15 LAB — POCT GLUCOSE: 98 MG/DL (ref 70–110)

## 2021-03-26 ENCOUNTER — OFFICE VISIT (OUTPATIENT)
Dept: PULMONOLOGY | Facility: CLINIC | Age: 71
End: 2021-03-26
Payer: MEDICARE

## 2021-03-26 ENCOUNTER — TELEPHONE (OUTPATIENT)
Dept: PULMONOLOGY | Facility: HOSPITAL | Age: 71
End: 2021-03-26

## 2021-03-26 VITALS
SYSTOLIC BLOOD PRESSURE: 110 MMHG | DIASTOLIC BLOOD PRESSURE: 68 MMHG | OXYGEN SATURATION: 98 % | BODY MASS INDEX: 28.56 KG/M2 | RESPIRATION RATE: 18 BRPM | WEIGHT: 182 LBS | HEIGHT: 67 IN | HEART RATE: 85 BPM

## 2021-03-26 DIAGNOSIS — I63.9 ACUTE CVA (CEREBROVASCULAR ACCIDENT): ICD-10-CM

## 2021-03-26 DIAGNOSIS — Z72.0 TOBACCO ABUSE: ICD-10-CM

## 2021-03-26 DIAGNOSIS — G47.33 OSA (OBSTRUCTIVE SLEEP APNEA): Primary | ICD-10-CM

## 2021-03-26 PROCEDURE — 3288F PR FALLS RISK ASSESSMENT DOCUMENTED: ICD-10-PCS | Mod: CPTII,S$GLB,, | Performed by: INTERNAL MEDICINE

## 2021-03-26 PROCEDURE — 99999 PR PBB SHADOW E&M-EST. PATIENT-LVL III: ICD-10-PCS | Mod: PBBFAC,,, | Performed by: INTERNAL MEDICINE

## 2021-03-26 PROCEDURE — 99204 PR OFFICE/OUTPT VISIT, NEW, LEVL IV, 45-59 MIN: ICD-10-PCS | Mod: S$GLB,,, | Performed by: INTERNAL MEDICINE

## 2021-03-26 PROCEDURE — 99999 PR PBB SHADOW E&M-EST. PATIENT-LVL III: CPT | Mod: PBBFAC,,, | Performed by: INTERNAL MEDICINE

## 2021-03-26 PROCEDURE — 99204 OFFICE O/P NEW MOD 45 MIN: CPT | Mod: S$GLB,,, | Performed by: INTERNAL MEDICINE

## 2021-03-26 PROCEDURE — 1159F PR MEDICATION LIST DOCUMENTED IN MEDICAL RECORD: ICD-10-PCS | Mod: S$GLB,,, | Performed by: INTERNAL MEDICINE

## 2021-03-26 PROCEDURE — 1101F PR PT FALLS ASSESS DOC 0-1 FALLS W/OUT INJ PAST YR: ICD-10-PCS | Mod: CPTII,S$GLB,, | Performed by: INTERNAL MEDICINE

## 2021-03-26 PROCEDURE — 3008F PR BODY MASS INDEX (BMI) DOCUMENTED: ICD-10-PCS | Mod: CPTII,S$GLB,, | Performed by: INTERNAL MEDICINE

## 2021-03-26 PROCEDURE — 3288F FALL RISK ASSESSMENT DOCD: CPT | Mod: CPTII,S$GLB,, | Performed by: INTERNAL MEDICINE

## 2021-03-26 PROCEDURE — 1159F MED LIST DOCD IN RCRD: CPT | Mod: S$GLB,,, | Performed by: INTERNAL MEDICINE

## 2021-03-26 PROCEDURE — 1101F PT FALLS ASSESS-DOCD LE1/YR: CPT | Mod: CPTII,S$GLB,, | Performed by: INTERNAL MEDICINE

## 2021-03-26 PROCEDURE — 3008F BODY MASS INDEX DOCD: CPT | Mod: CPTII,S$GLB,, | Performed by: INTERNAL MEDICINE

## 2021-04-12 ENCOUNTER — OFFICE VISIT (OUTPATIENT)
Dept: CARDIOLOGY | Facility: CLINIC | Age: 71
End: 2021-04-12
Payer: MEDICARE

## 2021-04-12 VITALS
WEIGHT: 177.69 LBS | BODY MASS INDEX: 27.83 KG/M2 | OXYGEN SATURATION: 98 % | SYSTOLIC BLOOD PRESSURE: 112 MMHG | DIASTOLIC BLOOD PRESSURE: 62 MMHG | HEART RATE: 79 BPM

## 2021-04-12 DIAGNOSIS — E78.2 MIXED HYPERLIPIDEMIA: ICD-10-CM

## 2021-04-12 DIAGNOSIS — I63.9 ACUTE CVA (CEREBROVASCULAR ACCIDENT): Primary | ICD-10-CM

## 2021-04-12 DIAGNOSIS — R94.31 ABNORMAL ELECTROCARDIOGRAM (ECG) (EKG): ICD-10-CM

## 2021-04-12 DIAGNOSIS — Z72.0 TOBACCO ABUSE: ICD-10-CM

## 2021-04-12 PROCEDURE — 99999 PR PBB SHADOW E&M-EST. PATIENT-LVL III: ICD-10-PCS | Mod: PBBFAC,,, | Performed by: INTERNAL MEDICINE

## 2021-04-12 PROCEDURE — 99205 OFFICE O/P NEW HI 60 MIN: CPT | Mod: S$GLB,,, | Performed by: INTERNAL MEDICINE

## 2021-04-12 PROCEDURE — 99205 PR OFFICE/OUTPT VISIT, NEW, LEVL V, 60-74 MIN: ICD-10-PCS | Mod: S$GLB,,, | Performed by: INTERNAL MEDICINE

## 2021-04-12 PROCEDURE — 1159F PR MEDICATION LIST DOCUMENTED IN MEDICAL RECORD: ICD-10-PCS | Mod: S$GLB,,, | Performed by: INTERNAL MEDICINE

## 2021-04-12 PROCEDURE — 3008F PR BODY MASS INDEX (BMI) DOCUMENTED: ICD-10-PCS | Mod: CPTII,S$GLB,, | Performed by: INTERNAL MEDICINE

## 2021-04-12 PROCEDURE — 3008F BODY MASS INDEX DOCD: CPT | Mod: CPTII,S$GLB,, | Performed by: INTERNAL MEDICINE

## 2021-04-12 PROCEDURE — 1159F MED LIST DOCD IN RCRD: CPT | Mod: S$GLB,,, | Performed by: INTERNAL MEDICINE

## 2021-04-12 PROCEDURE — 99999 PR PBB SHADOW E&M-EST. PATIENT-LVL III: CPT | Mod: PBBFAC,,, | Performed by: INTERNAL MEDICINE

## 2021-04-14 RX ORDER — ATORVASTATIN CALCIUM 40 MG/1
40 TABLET, FILM COATED ORAL DAILY
Qty: 30 TABLET | Refills: 11 | Status: SHIPPED | OUTPATIENT
Start: 2021-04-14 | End: 2022-04-14

## 2021-04-23 ENCOUNTER — TELEPHONE (OUTPATIENT)
Dept: PULMONOLOGY | Facility: CLINIC | Age: 71
End: 2021-04-23

## 2021-05-04 ENCOUNTER — HOSPITAL ENCOUNTER (EMERGENCY)
Facility: HOSPITAL | Age: 71
Discharge: HOME | End: 2021-05-04
Attending: EMERGENCY MEDICINE
Payer: MEDICARE

## 2021-05-04 VITALS
DIASTOLIC BLOOD PRESSURE: 64 MMHG | SYSTOLIC BLOOD PRESSURE: 104 MMHG | TEMPERATURE: 98 F | RESPIRATION RATE: 20 BRPM | OXYGEN SATURATION: 97 % | HEART RATE: 77 BPM | WEIGHT: 175.94 LBS | BODY MASS INDEX: 27.61 KG/M2 | HEIGHT: 67 IN

## 2021-05-04 DIAGNOSIS — S72.115A CLOSED NONDISPLACED FRACTURE OF GREATER TROCHANTER OF LEFT FEMUR, INITIAL ENCOUNTER: Primary | ICD-10-CM
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
## 2021-05-04 PROCEDURE — 99284 EMERGENCY DEPT VISIT MOD MDM: CPT | Mod: 25

## 2021-05-06 ENCOUNTER — TELEPHONE (OUTPATIENT)
Dept: ORTHOPEDICS | Facility: CLINIC | Age: 71
End: 2021-05-06

## 2021-05-11 DIAGNOSIS — M89.8X5 PAIN OF LEFT FEMUR: Primary | ICD-10-CM

## 2021-05-14 ENCOUNTER — OFFICE VISIT (OUTPATIENT)
Dept: ORTHOPEDICS | Facility: CLINIC | Age: 71
End: 2021-05-14
Payer: MEDICARE

## 2021-05-14 ENCOUNTER — HOSPITAL ENCOUNTER (OUTPATIENT)
Dept: RADIOLOGY | Facility: HOSPITAL | Age: 71
Discharge: HOME | End: 2021-05-14
Attending: PHYSICIAN ASSISTANT
Payer: MEDICARE

## 2021-05-14 VITALS
DIASTOLIC BLOOD PRESSURE: 80 MMHG | HEART RATE: 83 BPM | HEIGHT: 67 IN | WEIGHT: 175 LBS | SYSTOLIC BLOOD PRESSURE: 134 MMHG | BODY MASS INDEX: 27.47 KG/M2

## 2021-05-14 DIAGNOSIS — M89.8X5 PAIN OF LEFT FEMUR: ICD-10-CM

## 2021-05-14 DIAGNOSIS — M25.559 HIP PAIN: Primary | ICD-10-CM

## 2021-05-14 DIAGNOSIS — I71.40 ABDOMINAL AORTIC ANEURYSM (AAA) WITHOUT RUPTURE: Primary | ICD-10-CM

## 2021-05-14 DIAGNOSIS — Z01.818 ENCOUNTER FOR OTHER PREPROCEDURAL EXAMINATION: ICD-10-CM

## 2021-05-14 DIAGNOSIS — I71.40 ABDOMINAL AORTIC ANEURYSM (AAA) WITHOUT RUPTURE: ICD-10-CM

## 2021-05-14 DIAGNOSIS — S72.112A CLOSED DISPLACED FRACTURE OF GREATER TROCHANTER OF LEFT FEMUR, INITIAL ENCOUNTER: ICD-10-CM

## 2021-05-14 PROCEDURE — 1159F MED LIST DOCD IN RCRD: CPT | Mod: S$GLB,,, | Performed by: PHYSICIAN ASSISTANT

## 2021-05-14 PROCEDURE — 1101F PR PT FALLS ASSESS DOC 0-1 FALLS W/OUT INJ PAST YR: ICD-10-PCS | Mod: CPTII,S$GLB,, | Performed by: PHYSICIAN ASSISTANT

## 2021-05-14 PROCEDURE — 73552 XR FEMUR 2 VIEW LEFT: ICD-10-PCS | Mod: 26,LT,, | Performed by: RADIOLOGY

## 2021-05-14 PROCEDURE — 74174 CTA ABD&PLVS W/CONTRAST: CPT | Mod: TC

## 2021-05-14 PROCEDURE — 99214 PR OFFICE/OUTPT VISIT, EST, LEVL IV, 30-39 MIN: ICD-10-PCS | Mod: S$GLB,,, | Performed by: PHYSICIAN ASSISTANT

## 2021-05-14 PROCEDURE — 25500020 PHARM REV CODE 255: Performed by: PHYSICIAN ASSISTANT

## 2021-05-14 PROCEDURE — 1125F AMNT PAIN NOTED PAIN PRSNT: CPT | Mod: S$GLB,,, | Performed by: PHYSICIAN ASSISTANT

## 2021-05-14 PROCEDURE — 3288F PR FALLS RISK ASSESSMENT DOCUMENTED: ICD-10-PCS | Mod: CPTII,S$GLB,, | Performed by: PHYSICIAN ASSISTANT

## 2021-05-14 PROCEDURE — 1101F PT FALLS ASSESS-DOCD LE1/YR: CPT | Mod: CPTII,S$GLB,, | Performed by: PHYSICIAN ASSISTANT

## 2021-05-14 PROCEDURE — 99214 OFFICE O/P EST MOD 30 MIN: CPT | Mod: S$GLB,,, | Performed by: PHYSICIAN ASSISTANT

## 2021-05-14 PROCEDURE — 3288F FALL RISK ASSESSMENT DOCD: CPT | Mod: CPTII,S$GLB,, | Performed by: PHYSICIAN ASSISTANT

## 2021-05-14 PROCEDURE — 99999 PR PBB SHADOW E&M-EST. PATIENT-LVL IV: ICD-10-PCS | Mod: PBBFAC,,, | Performed by: PHYSICIAN ASSISTANT

## 2021-05-14 PROCEDURE — 3008F BODY MASS INDEX DOCD: CPT | Mod: CPTII,S$GLB,, | Performed by: PHYSICIAN ASSISTANT

## 2021-05-14 PROCEDURE — 1159F PR MEDICATION LIST DOCUMENTED IN MEDICAL RECORD: ICD-10-PCS | Mod: S$GLB,,, | Performed by: PHYSICIAN ASSISTANT

## 2021-05-14 PROCEDURE — 99999 PR PBB SHADOW E&M-EST. PATIENT-LVL IV: CPT | Mod: PBBFAC,,, | Performed by: PHYSICIAN ASSISTANT

## 2021-05-14 PROCEDURE — 73552 X-RAY EXAM OF FEMUR 2/>: CPT | Mod: TC,LT

## 2021-05-14 PROCEDURE — 1125F PR PAIN SEVERITY QUANTIFIED, PAIN PRESENT: ICD-10-PCS | Mod: S$GLB,,, | Performed by: PHYSICIAN ASSISTANT

## 2021-05-14 PROCEDURE — 73552 X-RAY EXAM OF FEMUR 2/>: CPT | Mod: 26,LT,, | Performed by: RADIOLOGY

## 2021-05-14 PROCEDURE — 3008F PR BODY MASS INDEX (BMI) DOCUMENTED: ICD-10-PCS | Mod: CPTII,S$GLB,, | Performed by: PHYSICIAN ASSISTANT

## 2021-05-14 RX ADMIN — IOHEXOL 100 ML: 350 INJECTION, SOLUTION INTRAVENOUS at 04:05

## 2021-05-17 ENCOUNTER — TELEPHONE (OUTPATIENT)
Dept: ORTHOPEDICS | Facility: CLINIC | Age: 71
End: 2021-05-17

## 2021-05-17 ENCOUNTER — TELEPHONE (OUTPATIENT)
Dept: VASCULAR SURGERY | Facility: CLINIC | Age: 71
End: 2021-05-17

## 2021-05-18 ENCOUNTER — TELEPHONE (OUTPATIENT)
Dept: RHEUMATOLOGY | Facility: CLINIC | Age: 71
End: 2021-05-18

## 2021-05-19 ENCOUNTER — TELEPHONE (OUTPATIENT)
Dept: VASCULAR SURGERY | Facility: CLINIC | Age: 71
End: 2021-05-19

## 2021-05-21 ENCOUNTER — TELEPHONE (OUTPATIENT)
Dept: ORTHOPEDICS | Facility: CLINIC | Age: 71
End: 2021-05-21

## 2021-05-21 ENCOUNTER — TELEPHONE (OUTPATIENT)
Dept: SURGERY | Facility: CLINIC | Age: 71
End: 2021-05-21

## 2021-05-21 ENCOUNTER — TELEPHONE (OUTPATIENT)
Dept: RHEUMATOLOGY | Facility: CLINIC | Age: 71
End: 2021-05-21

## 2021-06-01 ENCOUNTER — TELEPHONE (OUTPATIENT)
Dept: VASCULAR SURGERY | Facility: CLINIC | Age: 71
End: 2021-06-01

## 2021-06-10 ENCOUNTER — TELEPHONE (OUTPATIENT)
Dept: VASCULAR SURGERY | Facility: CLINIC | Age: 71
End: 2021-06-10

## 2021-06-18 ENCOUNTER — TELEPHONE (OUTPATIENT)
Dept: VASCULAR SURGERY | Facility: CLINIC | Age: 71
End: 2021-06-18

## 2021-07-06 ENCOUNTER — OFFICE VISIT (OUTPATIENT)
Dept: VASCULAR SURGERY | Facility: CLINIC | Age: 71
End: 2021-07-06
Payer: MEDICARE

## 2021-07-06 VITALS
SYSTOLIC BLOOD PRESSURE: 138 MMHG | WEIGHT: 175.06 LBS | DIASTOLIC BLOOD PRESSURE: 81 MMHG | BODY MASS INDEX: 27.42 KG/M2 | HEART RATE: 87 BPM

## 2021-07-06 DIAGNOSIS — I71.40 AAA (ABDOMINAL AORTIC ANEURYSM) WITHOUT RUPTURE: Primary | ICD-10-CM

## 2021-07-06 PROCEDURE — 1125F AMNT PAIN NOTED PAIN PRSNT: CPT | Mod: S$GLB,,, | Performed by: SURGERY

## 2021-07-06 PROCEDURE — 99999 PR PBB SHADOW E&M-EST. PATIENT-LVL III: ICD-10-PCS | Mod: PBBFAC,,, | Performed by: SURGERY

## 2021-07-06 PROCEDURE — 3008F PR BODY MASS INDEX (BMI) DOCUMENTED: ICD-10-PCS | Mod: CPTII,S$GLB,, | Performed by: SURGERY

## 2021-07-06 PROCEDURE — 99999 PR PBB SHADOW E&M-EST. PATIENT-LVL III: CPT | Mod: PBBFAC,,, | Performed by: SURGERY

## 2021-07-06 PROCEDURE — 3008F BODY MASS INDEX DOCD: CPT | Mod: CPTII,S$GLB,, | Performed by: SURGERY

## 2021-07-06 PROCEDURE — 1159F PR MEDICATION LIST DOCUMENTED IN MEDICAL RECORD: ICD-10-PCS | Mod: S$GLB,,, | Performed by: SURGERY

## 2021-07-06 PROCEDURE — 99203 OFFICE O/P NEW LOW 30 MIN: CPT | Mod: S$GLB,,, | Performed by: SURGERY

## 2021-07-06 PROCEDURE — 1125F PR PAIN SEVERITY QUANTIFIED, PAIN PRESENT: ICD-10-PCS | Mod: S$GLB,,, | Performed by: SURGERY

## 2021-07-06 PROCEDURE — 1159F MED LIST DOCD IN RCRD: CPT | Mod: S$GLB,,, | Performed by: SURGERY

## 2021-07-06 PROCEDURE — 99203 PR OFFICE/OUTPT VISIT, NEW, LEVL III, 30-44 MIN: ICD-10-PCS | Mod: S$GLB,,, | Performed by: SURGERY

## 2021-07-12 ENCOUNTER — TELEPHONE (OUTPATIENT)
Dept: GASTROENTEROLOGY | Facility: CLINIC | Age: 71
End: 2021-07-12

## 2021-07-15 ENCOUNTER — TELEPHONE (OUTPATIENT)
Dept: GASTROENTEROLOGY | Facility: CLINIC | Age: 71
End: 2021-07-15

## 2021-10-12 DIAGNOSIS — I71.40 AAA (ABDOMINAL AORTIC ANEURYSM) WITHOUT RUPTURE: Primary | ICD-10-CM

## 2022-03-08 ENCOUNTER — TELEPHONE (OUTPATIENT)
Dept: RHEUMATOLOGY | Facility: CLINIC | Age: 72
End: 2022-03-08
Payer: MEDICAID

## (undated) DEVICE — UNDERGLOVES BIOGEL PI SIZE 8.5

## (undated) DEVICE — NDL SPINAL 18GX3.5 SPINOCAN

## (undated) DEVICE — GAUZE SPONGE 4X4 12PLY

## (undated) DEVICE — SEE MEDLINE ITEM 152529

## (undated) DEVICE — DRAPE PLASTIC U 60X72

## (undated) DEVICE — ALCOHOL 70% ISOP RUBBING 4OZ

## (undated) DEVICE — DRAPE INCISE IOBAN 2 23X17IN

## (undated) DEVICE — SYS CLSR DERMABOND PRINEO 22CM

## (undated) DEVICE — SUT ETHILON 3-0 PS2 18 BLK

## (undated) DEVICE — SUT VICRYL 0 27 CT-2

## (undated) DEVICE — CONTAINER SPECIMEN STRL 4OZ

## (undated) DEVICE — LINER GLOVE POWDERFREE 8

## (undated) DEVICE — SEE MEDLINE ITEM 157131

## (undated) DEVICE — SEE MEDLINE ITEM 157166

## (undated) DEVICE — DRAPE STERI INSTRUMENT 1018

## (undated) DEVICE — SEE MEDLINE ITEM 157216

## (undated) DEVICE — TIP SUCTION YANKAUER

## (undated) DEVICE — SUT VICRYL BR 1 GEN 27 CT-1

## (undated) DEVICE — NDL HYPODERMIC BLUNT 18G 1.5IN

## (undated) DEVICE — SHEET XLGE DRAPE

## (undated) DEVICE — BLADE SAG DUAL 18MMX1.27MMX90M

## (undated) DEVICE — POSITIONER HEAD DONUT 9IN FOAM

## (undated) DEVICE — MANIFOLD 4 PORT

## (undated) DEVICE — ELECTRODE REM PLYHSV RETURN 9

## (undated) DEVICE — KIT IRR SUCTION HND PIECE

## (undated) DEVICE — SYR 3CC LUER LOC

## (undated) DEVICE — SEE MEDLINE ITEM 152739

## (undated) DEVICE — SYR ONLY LUER LOCK 20CC

## (undated) DEVICE — APPLICATOR CHLORAPREP ORN 26ML

## (undated) DEVICE — SEE MEDLINE ITEM 157117

## (undated) DEVICE — GLOVE SURGICAL LATEX SZ 8

## (undated) DEVICE — SYR 30CC LUER LOCK

## (undated) DEVICE — SUPPORT ULNA NERVE PROTECTOR

## (undated) DEVICE — SCRUB 10% POVIDONE IODINE 4OZ

## (undated) DEVICE — GLOVE SURG BIOGEL LATEX SZ 7.5

## (undated) DEVICE — SEE MEDLINE ITEM 157027

## (undated) DEVICE — SEE MEDLINE ITEM 152622

## (undated) DEVICE — NDL FILTER MICRON 18G 1 1/2

## (undated) DEVICE — SUT PROLENE 2-0 FSLX BL

## (undated) DEVICE — SPONGE DERMACEA GAUZE 4X4

## (undated) DEVICE — KIT TRIMANO CHIN